# Patient Record
Sex: MALE | Race: WHITE | Employment: UNEMPLOYED | URBAN - METROPOLITAN AREA
[De-identification: names, ages, dates, MRNs, and addresses within clinical notes are randomized per-mention and may not be internally consistent; named-entity substitution may affect disease eponyms.]

---

## 2017-01-09 ENCOUNTER — OFFICE VISIT (OUTPATIENT)
Dept: INTERNAL MEDICINE | Age: 62
End: 2017-01-09

## 2017-01-09 VITALS
BODY MASS INDEX: 26.52 KG/M2 | SYSTOLIC BLOOD PRESSURE: 140 MMHG | WEIGHT: 175 LBS | DIASTOLIC BLOOD PRESSURE: 78 MMHG | HEIGHT: 68 IN

## 2017-01-09 DIAGNOSIS — Z00.00 WELL ADULT EXAM: Primary | ICD-10-CM

## 2017-01-09 DIAGNOSIS — F34.1 DYSTHYMIA: ICD-10-CM

## 2017-01-09 DIAGNOSIS — E78.5 HYPERLIPIDEMIA, UNSPECIFIED HYPERLIPIDEMIA TYPE: Chronic | ICD-10-CM

## 2017-01-09 DIAGNOSIS — R03.0 BORDERLINE SYSTOLIC HTN: ICD-10-CM

## 2017-01-09 PROCEDURE — 99396 PREV VISIT EST AGE 40-64: CPT | Performed by: INTERNAL MEDICINE

## 2017-01-09 PROCEDURE — 93000 ELECTROCARDIOGRAM COMPLETE: CPT | Performed by: INTERNAL MEDICINE

## 2017-02-14 ENCOUNTER — TELEPHONE (OUTPATIENT)
Dept: INTERNAL MEDICINE | Age: 62
End: 2017-02-14

## 2017-02-15 ENCOUNTER — OFFICE VISIT (OUTPATIENT)
Dept: INTERNAL MEDICINE | Age: 62
End: 2017-02-15

## 2017-02-15 VITALS
BODY MASS INDEX: 26.83 KG/M2 | HEIGHT: 68 IN | SYSTOLIC BLOOD PRESSURE: 112 MMHG | WEIGHT: 177 LBS | DIASTOLIC BLOOD PRESSURE: 80 MMHG

## 2017-02-15 DIAGNOSIS — T14.8XXA PULLED MUSCLE: Primary | ICD-10-CM

## 2017-02-15 PROCEDURE — 99213 OFFICE O/P EST LOW 20 MIN: CPT | Performed by: INTERNAL MEDICINE

## 2017-02-15 ASSESSMENT — ENCOUNTER SYMPTOMS
CHEST TIGHTNESS: 0
EYE ITCHING: 0
ANAL BLEEDING: 0
ABDOMINAL DISTENTION: 0
APNEA: 0
RHINORRHEA: 0
FACIAL SWELLING: 0
ABDOMINAL PAIN: 0
COUGH: 0
CONSTIPATION: 0
EYE DISCHARGE: 0
SHORTNESS OF BREATH: 0
BLOOD IN STOOL: 0
EYE REDNESS: 0
EYE PAIN: 0
CHOKING: 0

## 2017-02-28 ENCOUNTER — TELEPHONE (OUTPATIENT)
Dept: INTERNAL MEDICINE | Age: 62
End: 2017-02-28

## 2017-02-28 DIAGNOSIS — Z00.00 WELL ADULT ON ROUTINE HEALTH CHECK: ICD-10-CM

## 2017-02-28 DIAGNOSIS — Z00.00 WELL ADULT ON ROUTINE HEALTH CHECK: Primary | ICD-10-CM

## 2017-02-28 LAB
A/G RATIO: 2.2 (ref 1.1–2.2)
ALBUMIN SERPL-MCNC: 4.8 G/DL (ref 3.4–5)
ALP BLD-CCNC: 77 U/L (ref 40–129)
ALT SERPL-CCNC: 24 U/L (ref 10–40)
ANION GAP SERPL CALCULATED.3IONS-SCNC: 20 MMOL/L (ref 3–16)
AST SERPL-CCNC: 17 U/L (ref 15–37)
BILIRUB SERPL-MCNC: 0.4 MG/DL (ref 0–1)
BUN BLDV-MCNC: 17 MG/DL (ref 7–20)
CALCIUM SERPL-MCNC: 9.7 MG/DL (ref 8.3–10.6)
CHLORIDE BLD-SCNC: 102 MMOL/L (ref 99–110)
CHOLESTEROL, TOTAL: 170 MG/DL (ref 0–199)
CO2: 23 MMOL/L (ref 21–32)
CREAT SERPL-MCNC: 0.9 MG/DL (ref 0.9–1.3)
GFR AFRICAN AMERICAN: >60
GFR NON-AFRICAN AMERICAN: >60
GLOBULIN: 2.2 G/DL
GLUCOSE BLD-MCNC: 100 MG/DL (ref 70–99)
HDLC SERPL-MCNC: 34 MG/DL (ref 40–60)
LDL CHOLESTEROL CALCULATED: 88 MG/DL
POTASSIUM SERPL-SCNC: 4.6 MMOL/L (ref 3.5–5.1)
SODIUM BLD-SCNC: 145 MMOL/L (ref 136–145)
TOTAL PROTEIN: 7 G/DL (ref 6.4–8.2)
TRIGL SERPL-MCNC: 242 MG/DL (ref 0–150)
TSH SERPL DL<=0.05 MIU/L-ACNC: 2.29 UIU/ML (ref 0.27–4.2)
VITAMIN D 25-HYDROXY: 34.6 NG/ML
VLDLC SERPL CALC-MCNC: 48 MG/DL

## 2017-03-01 ENCOUNTER — TELEPHONE (OUTPATIENT)
Dept: INTERNAL MEDICINE | Age: 62
End: 2017-03-01

## 2017-03-01 DIAGNOSIS — Z00.00 ANNUAL PHYSICAL EXAM: Primary | ICD-10-CM

## 2017-05-18 ENCOUNTER — TELEPHONE (OUTPATIENT)
Dept: INTERNAL MEDICINE | Age: 62
End: 2017-05-18

## 2017-05-18 DIAGNOSIS — C61 PROSTATE CANCER (HCC): Primary | ICD-10-CM

## 2017-05-27 DIAGNOSIS — C61 PROSTATE CANCER (HCC): ICD-10-CM

## 2017-05-27 LAB — PROSTATE SPECIFIC ANTIGEN: 2.23 NG/ML (ref 0–4)

## 2017-06-26 RX ORDER — SIMVASTATIN 20 MG
TABLET ORAL
Qty: 30 TABLET | Refills: 4 | Status: SHIPPED | OUTPATIENT
Start: 2017-06-26 | End: 2018-08-17 | Stop reason: SDUPTHER

## 2017-09-05 ENCOUNTER — TELEPHONE (OUTPATIENT)
Dept: INTERNAL MEDICINE | Age: 62
End: 2017-09-05

## 2017-09-05 DIAGNOSIS — C61 PROSTATE CANCER (HCC): Primary | ICD-10-CM

## 2017-09-05 DIAGNOSIS — E55.9 VITAMIN D DEFICIENCY: ICD-10-CM

## 2017-09-05 DIAGNOSIS — E78.5 HYPERLIPIDEMIA, UNSPECIFIED HYPERLIPIDEMIA TYPE: Chronic | ICD-10-CM

## 2017-09-19 DIAGNOSIS — E55.9 VITAMIN D DEFICIENCY: ICD-10-CM

## 2017-09-19 DIAGNOSIS — E78.5 HYPERLIPIDEMIA, UNSPECIFIED HYPERLIPIDEMIA TYPE: Chronic | ICD-10-CM

## 2017-09-19 DIAGNOSIS — C61 PROSTATE CANCER (HCC): ICD-10-CM

## 2017-09-19 LAB
A/G RATIO: 1.9 (ref 1.1–2.2)
ALBUMIN SERPL-MCNC: 4.7 G/DL (ref 3.4–5)
ALP BLD-CCNC: 73 U/L (ref 40–129)
ALT SERPL-CCNC: 21 U/L (ref 10–40)
ANION GAP SERPL CALCULATED.3IONS-SCNC: 19 MMOL/L (ref 3–16)
AST SERPL-CCNC: 16 U/L (ref 15–37)
BASOPHILS ABSOLUTE: 0.1 K/UL (ref 0–0.2)
BASOPHILS RELATIVE PERCENT: 1.2 %
BILIRUB SERPL-MCNC: 0.6 MG/DL (ref 0–1)
BUN BLDV-MCNC: 16 MG/DL (ref 7–20)
CALCIUM SERPL-MCNC: 9.6 MG/DL (ref 8.3–10.6)
CHLORIDE BLD-SCNC: 101 MMOL/L (ref 99–110)
CHOLESTEROL, TOTAL: 165 MG/DL (ref 0–199)
CO2: 23 MMOL/L (ref 21–32)
CREAT SERPL-MCNC: 0.9 MG/DL (ref 0.9–1.3)
EOSINOPHILS ABSOLUTE: 0.3 K/UL (ref 0–0.6)
EOSINOPHILS RELATIVE PERCENT: 2.6 %
GFR AFRICAN AMERICAN: >60
GFR NON-AFRICAN AMERICAN: >60
GLOBULIN: 2.5 G/DL
GLUCOSE BLD-MCNC: 93 MG/DL (ref 70–99)
HCT VFR BLD CALC: 47.2 % (ref 40.5–52.5)
HDLC SERPL-MCNC: 32 MG/DL (ref 40–60)
HEMOGLOBIN: 15.8 G/DL (ref 13.5–17.5)
LDL CHOLESTEROL CALCULATED: 78 MG/DL
LYMPHOCYTES ABSOLUTE: 2.3 K/UL (ref 1–5.1)
LYMPHOCYTES RELATIVE PERCENT: 22.1 %
MCH RBC QN AUTO: 27.7 PG (ref 26–34)
MCHC RBC AUTO-ENTMCNC: 33.5 G/DL (ref 31–36)
MCV RBC AUTO: 82.7 FL (ref 80–100)
MONOCYTES ABSOLUTE: 0.7 K/UL (ref 0–1.3)
MONOCYTES RELATIVE PERCENT: 6.9 %
NEUTROPHILS ABSOLUTE: 7 K/UL (ref 1.7–7.7)
NEUTROPHILS RELATIVE PERCENT: 67.2 %
PDW BLD-RTO: 14.4 % (ref 12.4–15.4)
PLATELET # BLD: 241 K/UL (ref 135–450)
PMV BLD AUTO: 9.5 FL (ref 5–10.5)
POTASSIUM SERPL-SCNC: 4.3 MMOL/L (ref 3.5–5.1)
PROSTATE SPECIFIC ANTIGEN: 2.27 NG/ML (ref 0–4)
RBC # BLD: 5.7 M/UL (ref 4.2–5.9)
SODIUM BLD-SCNC: 143 MMOL/L (ref 136–145)
TOTAL PROTEIN: 7.2 G/DL (ref 6.4–8.2)
TRIGL SERPL-MCNC: 273 MG/DL (ref 0–150)
VITAMIN D 25-HYDROXY: 31.7 NG/ML
VLDLC SERPL CALC-MCNC: 55 MG/DL
WBC # BLD: 10.3 K/UL (ref 4–11)

## 2017-09-26 ENCOUNTER — TELEPHONE (OUTPATIENT)
Dept: INTERNAL MEDICINE | Age: 62
End: 2017-09-26

## 2017-12-18 ENCOUNTER — TELEPHONE (OUTPATIENT)
Dept: INTERNAL MEDICINE | Age: 62
End: 2017-12-18

## 2017-12-18 DIAGNOSIS — C61 PROSTATE CANCER (HCC): Primary | ICD-10-CM

## 2017-12-26 LAB — PROSTATE SPECIFIC ANTIGEN: 2.08 NG/ML (ref 0–4)

## 2018-01-02 ENCOUNTER — TELEPHONE (OUTPATIENT)
Dept: INTERNAL MEDICINE | Age: 63
End: 2018-01-02

## 2018-02-08 ENCOUNTER — TELEPHONE (OUTPATIENT)
Dept: INTERNAL MEDICINE | Age: 63
End: 2018-02-08

## 2018-03-07 ENCOUNTER — TELEPHONE (OUTPATIENT)
Dept: INTERNAL MEDICINE | Age: 63
End: 2018-03-07

## 2018-03-07 DIAGNOSIS — C61 PROSTATE CANCER (HCC): ICD-10-CM

## 2018-03-07 DIAGNOSIS — Z00.00 WELL ADULT HEALTH CHECK: Primary | ICD-10-CM

## 2018-03-07 NOTE — TELEPHONE ENCOUNTER
Pt had to Reschedule physical appt to June 01 2018, but pt would like to have labs done now. Pt states he wants PSA, CBC, CMP, Vitamin D, Lipid and A1C. Please call pt when these are complete.

## 2018-03-16 DIAGNOSIS — Z00.00 WELL ADULT HEALTH CHECK: ICD-10-CM

## 2018-03-16 LAB
A/G RATIO: 2.6 (ref 1.1–2.2)
ALBUMIN SERPL-MCNC: 5.1 G/DL (ref 3.4–5)
ALP BLD-CCNC: 71 U/L (ref 40–129)
ALT SERPL-CCNC: 24 U/L (ref 10–40)
ANION GAP SERPL CALCULATED.3IONS-SCNC: 19 MMOL/L (ref 3–16)
AST SERPL-CCNC: 16 U/L (ref 15–37)
BASOPHILS ABSOLUTE: 0.1 K/UL (ref 0–0.2)
BASOPHILS RELATIVE PERCENT: 1 %
BILIRUB SERPL-MCNC: 0.3 MG/DL (ref 0–1)
BUN BLDV-MCNC: 18 MG/DL (ref 7–20)
CALCIUM SERPL-MCNC: 9.6 MG/DL (ref 8.3–10.6)
CHLORIDE BLD-SCNC: 101 MMOL/L (ref 99–110)
CHOLESTEROL, TOTAL: 156 MG/DL (ref 0–199)
CO2: 26 MMOL/L (ref 21–32)
CREAT SERPL-MCNC: 0.9 MG/DL (ref 0.8–1.3)
EOSINOPHILS ABSOLUTE: 0.3 K/UL (ref 0–0.6)
EOSINOPHILS RELATIVE PERCENT: 3.4 %
GFR AFRICAN AMERICAN: >60
GFR NON-AFRICAN AMERICAN: >60
GLOBULIN: 2 G/DL
GLUCOSE BLD-MCNC: 95 MG/DL (ref 70–99)
HCT VFR BLD CALC: 46.9 % (ref 40.5–52.5)
HDLC SERPL-MCNC: 33 MG/DL (ref 40–60)
HEMOGLOBIN: 16 G/DL (ref 13.5–17.5)
LDL CHOLESTEROL CALCULATED: 77 MG/DL
LYMPHOCYTES ABSOLUTE: 2.4 K/UL (ref 1–5.1)
LYMPHOCYTES RELATIVE PERCENT: 26.6 %
MCH RBC QN AUTO: 28.4 PG (ref 26–34)
MCHC RBC AUTO-ENTMCNC: 34.1 G/DL (ref 31–36)
MCV RBC AUTO: 83.2 FL (ref 80–100)
MONOCYTES ABSOLUTE: 0.6 K/UL (ref 0–1.3)
MONOCYTES RELATIVE PERCENT: 7.3 %
NEUTROPHILS ABSOLUTE: 5.5 K/UL (ref 1.7–7.7)
NEUTROPHILS RELATIVE PERCENT: 61.7 %
PDW BLD-RTO: 14.2 % (ref 12.4–15.4)
PLATELET # BLD: 233 K/UL (ref 135–450)
PMV BLD AUTO: 9.4 FL (ref 5–10.5)
POTASSIUM SERPL-SCNC: 4.4 MMOL/L (ref 3.5–5.1)
PROSTATE SPECIFIC ANTIGEN: 2.22 NG/ML (ref 0–4)
RBC # BLD: 5.63 M/UL (ref 4.2–5.9)
SODIUM BLD-SCNC: 146 MMOL/L (ref 136–145)
TOTAL PROTEIN: 7.1 G/DL (ref 6.4–8.2)
TRIGL SERPL-MCNC: 229 MG/DL (ref 0–150)
TSH SERPL DL<=0.05 MIU/L-ACNC: 2.06 UIU/ML (ref 0.27–4.2)
VITAMIN D 25-HYDROXY: 53.4 NG/ML
VLDLC SERPL CALC-MCNC: 46 MG/DL
WBC # BLD: 8.9 K/UL (ref 4–11)

## 2018-03-17 LAB
ESTIMATED AVERAGE GLUCOSE: 116.9 MG/DL
HBA1C MFR BLD: 5.7 %

## 2018-03-19 ENCOUNTER — TELEPHONE (OUTPATIENT)
Dept: INTERNAL MEDICINE | Age: 63
End: 2018-03-19

## 2018-03-26 ENCOUNTER — TELEPHONE (OUTPATIENT)
Dept: INTERNAL MEDICINE | Age: 63
End: 2018-03-26

## 2018-03-26 ENCOUNTER — OFFICE VISIT (OUTPATIENT)
Dept: ENT CLINIC | Age: 63
End: 2018-03-26

## 2018-03-26 ENCOUNTER — OFFICE VISIT (OUTPATIENT)
Dept: INTERNAL MEDICINE | Age: 63
End: 2018-03-26

## 2018-03-26 VITALS
WEIGHT: 165 LBS | BODY MASS INDEX: 25.01 KG/M2 | DIASTOLIC BLOOD PRESSURE: 80 MMHG | SYSTOLIC BLOOD PRESSURE: 110 MMHG | HEIGHT: 68 IN

## 2018-03-26 VITALS
HEIGHT: 68 IN | TEMPERATURE: 98.3 F | HEART RATE: 50 BPM | SYSTOLIC BLOOD PRESSURE: 154 MMHG | DIASTOLIC BLOOD PRESSURE: 79 MMHG | BODY MASS INDEX: 26.28 KG/M2 | WEIGHT: 173.4 LBS

## 2018-03-26 DIAGNOSIS — H61.22 IMPACTED CERUMEN OF LEFT EAR: ICD-10-CM

## 2018-03-26 DIAGNOSIS — K13.0 ABSCESS, LIP: Primary | ICD-10-CM

## 2018-03-26 DIAGNOSIS — L02.02 FURUNCLE OF FACE: Primary | ICD-10-CM

## 2018-03-26 PROCEDURE — 99213 OFFICE O/P EST LOW 20 MIN: CPT | Performed by: HOSPITALIST

## 2018-03-26 PROCEDURE — 99243 OFF/OP CNSLTJ NEW/EST LOW 30: CPT | Performed by: OTOLARYNGOLOGY

## 2018-03-26 PROCEDURE — 69210 REMOVE IMPACTED EAR WAX UNI: CPT | Performed by: OTOLARYNGOLOGY

## 2018-03-26 RX ORDER — SULFAMETHOXAZOLE AND TRIMETHOPRIM 800; 160 MG/1; MG/1
1 TABLET ORAL 2 TIMES DAILY
Qty: 20 TABLET | Refills: 1 | Status: SHIPPED | OUTPATIENT
Start: 2018-03-26 | End: 2018-04-05

## 2018-03-26 NOTE — PROGRESS NOTES
Thought content normal.           Assessment/ plan: Kristina Chen was seen today for swelling.     Diagnoses and all orders for this visit:    Furuncle of face, left lower lip margin: explained to pt that he will need I & D and possibly a course of abx as well.  -placed a call to Dr Debby Griffin who will fit him in this morning  -pt to return to me if Dr Debby Griffin recommends abx    Follow-up as needed  Wendy Faith MD, Susan Leslie

## 2018-03-26 NOTE — PROGRESS NOTES
canal.  TUNING FORKS: Rinne ++ Dumas midline at 512 Hz  INTRANASAL:  Septum midline, turbinates normal, meati clear. LIPS, TEETH, GINGIVA:  Abscess left lower lip. PHARYNX:  Normal  NECK:  No masses or adenopathy  SALIVARY GLANDS:  Normal  THYROID:  Normal  Cerumen removed from the left ear using suction. The tympanic membrane and middle ear space are normal.  IMPRESSION: Abscess left lower lip. PLAN: Patient is disinclined to have incision and drainage under local anesthetic. After discussion with patient, agree to prescribe Bactrim DS twice a day for 10 days. FOLLOW-UP: 10 days unless swelling and pain increase.

## 2018-06-01 ENCOUNTER — OFFICE VISIT (OUTPATIENT)
Dept: INTERNAL MEDICINE | Age: 63
End: 2018-06-01

## 2018-06-01 VITALS
SYSTOLIC BLOOD PRESSURE: 138 MMHG | DIASTOLIC BLOOD PRESSURE: 80 MMHG | BODY MASS INDEX: 26.37 KG/M2 | WEIGHT: 174 LBS | HEIGHT: 68 IN

## 2018-06-01 DIAGNOSIS — E78.5 HYPERLIPIDEMIA, UNSPECIFIED HYPERLIPIDEMIA TYPE: Chronic | ICD-10-CM

## 2018-06-01 DIAGNOSIS — R94.31 EKG ABNORMALITIES: ICD-10-CM

## 2018-06-01 DIAGNOSIS — L30.9 ECZEMA, UNSPECIFIED TYPE: ICD-10-CM

## 2018-06-01 DIAGNOSIS — Z00.00 WELL ADULT EXAM: Primary | ICD-10-CM

## 2018-06-01 DIAGNOSIS — C61 PROSTATE CANCER (HCC): ICD-10-CM

## 2018-06-01 DIAGNOSIS — E55.9 VITAMIN D DEFICIENCY: ICD-10-CM

## 2018-06-01 DIAGNOSIS — F34.1 DYSTHYMIA: ICD-10-CM

## 2018-06-01 PROCEDURE — 93000 ELECTROCARDIOGRAM COMPLETE: CPT | Performed by: INTERNAL MEDICINE

## 2018-06-01 PROCEDURE — 99396 PREV VISIT EST AGE 40-64: CPT | Performed by: INTERNAL MEDICINE

## 2018-06-01 RX ORDER — TRIAMCINOLONE ACETONIDE 1 MG/G
CREAM TOPICAL
Qty: 1 TUBE | Refills: 1 | Status: SHIPPED | OUTPATIENT
Start: 2018-06-01 | End: 2019-09-17 | Stop reason: CLARIF

## 2018-06-01 RX ORDER — BUPROPION HYDROCHLORIDE 150 MG/1
150 TABLET ORAL EVERY MORNING
Qty: 30 TABLET | Refills: 5 | Status: SHIPPED | OUTPATIENT
Start: 2018-06-01 | End: 2019-09-17 | Stop reason: CLARIF

## 2018-06-01 ASSESSMENT — PATIENT HEALTH QUESTIONNAIRE - PHQ9
10. IF YOU CHECKED OFF ANY PROBLEMS, HOW DIFFICULT HAVE THESE PROBLEMS MADE IT FOR YOU TO DO YOUR WORK, TAKE CARE OF THINGS AT HOME, OR GET ALONG WITH OTHER PEOPLE: 0
9. THOUGHTS THAT YOU WOULD BE BETTER OFF DEAD, OR OF HURTING YOURSELF: 0
8. MOVING OR SPEAKING SO SLOWLY THAT OTHER PEOPLE COULD HAVE NOTICED. OR THE OPPOSITE, BEING SO FIGETY OR RESTLESS THAT YOU HAVE BEEN MOVING AROUND A LOT MORE THAN USUAL: 0
5. POOR APPETITE OR OVEREATING: 0
1. LITTLE INTEREST OR PLEASURE IN DOING THINGS: 0
2. FEELING DOWN, DEPRESSED OR HOPELESS: 3
4. FEELING TIRED OR HAVING LITTLE ENERGY: 0
6. FEELING BAD ABOUT YOURSELF - OR THAT YOU ARE A FAILURE OR HAVE LET YOURSELF OR YOUR FAMILY DOWN: 3
7. TROUBLE CONCENTRATING ON THINGS, SUCH AS READING THE NEWSPAPER OR WATCHING TELEVISION: 0
SUM OF ALL RESPONSES TO PHQ QUESTIONS 1-9: 6
SUM OF ALL RESPONSES TO PHQ9 QUESTIONS 1 & 2: 3
3. TROUBLE FALLING OR STAYING ASLEEP: 0

## 2018-06-13 DIAGNOSIS — C61 PROSTATE CANCER (HCC): ICD-10-CM

## 2018-06-13 LAB — PROSTATE SPECIFIC ANTIGEN: 5.17 NG/ML (ref 0–4)

## 2018-06-18 ENCOUNTER — TELEPHONE (OUTPATIENT)
Dept: INTERNAL MEDICINE | Age: 63
End: 2018-06-18

## 2018-06-18 DIAGNOSIS — R97.20 ELEVATED PSA MEASUREMENT: Primary | ICD-10-CM

## 2018-06-19 ENCOUNTER — TELEPHONE (OUTPATIENT)
Dept: INTERNAL MEDICINE | Age: 63
End: 2018-06-19

## 2018-06-19 DIAGNOSIS — R97.20 ELEVATED PSA MEASUREMENT: ICD-10-CM

## 2018-06-19 LAB — PROSTATE SPECIFIC ANTIGEN: 7.13 NG/ML (ref 0–4)

## 2018-06-20 ENCOUNTER — TELEPHONE (OUTPATIENT)
Dept: INTERNAL MEDICINE | Age: 63
End: 2018-06-20

## 2018-06-20 DIAGNOSIS — R97.20 ELEVATED PSA: ICD-10-CM

## 2018-06-20 DIAGNOSIS — R30.0 DYSURIA: Primary | ICD-10-CM

## 2018-06-20 LAB
BILIRUBIN URINE: NEGATIVE
BLOOD, URINE: NEGATIVE
CLARITY: CLEAR
COLOR: YELLOW
GLUCOSE URINE: NEGATIVE MG/DL
KETONES, URINE: NEGATIVE MG/DL
LEUKOCYTE ESTERASE, URINE: NEGATIVE
MICROSCOPIC EXAMINATION: NORMAL
NITRITE, URINE: NEGATIVE
PH UA: 5.5
PROTEIN UA: NEGATIVE MG/DL
SPECIFIC GRAVITY UA: 1.03
URINE TYPE: NORMAL
UROBILINOGEN, URINE: 0.2 E.U./DL

## 2018-06-20 RX ORDER — CIPROFLOXACIN 500 MG/1
500 TABLET, FILM COATED ORAL 2 TIMES DAILY
Qty: 28 TABLET | Refills: 0 | Status: SHIPPED | OUTPATIENT
Start: 2018-06-20 | End: 2018-07-04

## 2018-06-22 LAB — URINE CULTURE, ROUTINE: NORMAL

## 2018-06-25 DIAGNOSIS — C61 PROSTATE CANCER (HCC): Primary | ICD-10-CM

## 2018-08-07 ENCOUNTER — TELEPHONE (OUTPATIENT)
Dept: INTERNAL MEDICINE | Age: 63
End: 2018-08-07

## 2018-08-07 DIAGNOSIS — C61 PROSTATE CANCER (HCC): Primary | ICD-10-CM

## 2018-08-17 RX ORDER — SIMVASTATIN 20 MG
TABLET ORAL
Qty: 30 TABLET | Refills: 3 | Status: SHIPPED | OUTPATIENT
Start: 2018-08-17 | End: 2019-09-17 | Stop reason: CLARIF

## 2018-08-20 DIAGNOSIS — C61 PROSTATE CANCER (HCC): ICD-10-CM

## 2018-08-20 LAB — PROSTATE SPECIFIC ANTIGEN: 3.38 NG/ML (ref 0–4)

## 2018-11-26 ENCOUNTER — TELEPHONE (OUTPATIENT)
Dept: INTERNAL MEDICINE CLINIC | Age: 63
End: 2018-11-26

## 2018-11-26 DIAGNOSIS — C61 PROSTATE CANCER (HCC): Primary | ICD-10-CM

## 2018-12-14 DIAGNOSIS — C61 PROSTATE CANCER (HCC): ICD-10-CM

## 2018-12-14 LAB — PROSTATE SPECIFIC ANTIGEN: 3.04 NG/ML (ref 0–4)

## 2019-03-12 ENCOUNTER — TELEPHONE (OUTPATIENT)
Dept: INTERNAL MEDICINE CLINIC | Age: 64
End: 2019-03-12

## 2019-03-12 DIAGNOSIS — C61 PROSTATE CANCER (HCC): Primary | ICD-10-CM

## 2019-03-21 DIAGNOSIS — C61 PROSTATE CANCER (HCC): ICD-10-CM

## 2019-03-21 LAB — PROSTATE SPECIFIC ANTIGEN: 3.21 NG/ML (ref 0–4)

## 2019-08-01 ENCOUNTER — TELEPHONE (OUTPATIENT)
Dept: INTERNAL MEDICINE CLINIC | Age: 64
End: 2019-08-01

## 2019-08-01 DIAGNOSIS — Z00.00 WELL ADULT HEALTH CHECK: Primary | ICD-10-CM

## 2019-08-01 DIAGNOSIS — Z12.5 SCREENING PSA (PROSTATE SPECIFIC ANTIGEN): ICD-10-CM

## 2019-08-20 DIAGNOSIS — Z00.00 WELL ADULT HEALTH CHECK: ICD-10-CM

## 2019-08-20 DIAGNOSIS — Z12.5 SCREENING PSA (PROSTATE SPECIFIC ANTIGEN): ICD-10-CM

## 2019-08-20 LAB
A/G RATIO: 2.5 (ref 1.1–2.2)
ALBUMIN SERPL-MCNC: 4.9 G/DL (ref 3.4–5)
ALP BLD-CCNC: 80 U/L (ref 40–129)
ALT SERPL-CCNC: 17 U/L (ref 10–40)
ANION GAP SERPL CALCULATED.3IONS-SCNC: 15 MMOL/L (ref 3–16)
AST SERPL-CCNC: 15 U/L (ref 15–37)
BASOPHILS ABSOLUTE: 0 K/UL (ref 0–0.2)
BASOPHILS RELATIVE PERCENT: 0.5 %
BILIRUB SERPL-MCNC: <0.2 MG/DL (ref 0–1)
BILIRUBIN URINE: NEGATIVE
BLOOD, URINE: NEGATIVE
BUN BLDV-MCNC: 13 MG/DL (ref 7–20)
CALCIUM SERPL-MCNC: 9.8 MG/DL (ref 8.3–10.6)
CHLORIDE BLD-SCNC: 101 MMOL/L (ref 99–110)
CHOLESTEROL, TOTAL: 160 MG/DL (ref 0–199)
CLARITY: CLEAR
CO2: 24 MMOL/L (ref 21–32)
COLOR: YELLOW
CREAT SERPL-MCNC: 0.9 MG/DL (ref 0.8–1.3)
EOSINOPHILS ABSOLUTE: 0.3 K/UL (ref 0–0.6)
EOSINOPHILS RELATIVE PERCENT: 3.9 %
GFR AFRICAN AMERICAN: >60
GFR NON-AFRICAN AMERICAN: >60
GLOBULIN: 2 G/DL
GLUCOSE BLD-MCNC: 91 MG/DL (ref 70–99)
GLUCOSE URINE: NEGATIVE MG/DL
HCT VFR BLD CALC: 46.1 % (ref 40.5–52.5)
HDLC SERPL-MCNC: 31 MG/DL (ref 40–60)
HEMOGLOBIN: 15.7 G/DL (ref 13.5–17.5)
KETONES, URINE: NEGATIVE MG/DL
LDL CHOLESTEROL CALCULATED: 77 MG/DL
LEUKOCYTE ESTERASE, URINE: NEGATIVE
LYMPHOCYTES ABSOLUTE: 2.2 K/UL (ref 1–5.1)
LYMPHOCYTES RELATIVE PERCENT: 28.3 %
MCH RBC QN AUTO: 27.9 PG (ref 26–34)
MCHC RBC AUTO-ENTMCNC: 34 G/DL (ref 31–36)
MCV RBC AUTO: 82 FL (ref 80–100)
MICROSCOPIC EXAMINATION: NORMAL
MONOCYTES ABSOLUTE: 0.5 K/UL (ref 0–1.3)
MONOCYTES RELATIVE PERCENT: 6.1 %
NEUTROPHILS ABSOLUTE: 4.8 K/UL (ref 1.7–7.7)
NEUTROPHILS RELATIVE PERCENT: 61.2 %
NITRITE, URINE: NEGATIVE
PDW BLD-RTO: 14.5 % (ref 12.4–15.4)
PH UA: 5.5 (ref 5–8)
PLATELET # BLD: 219 K/UL (ref 135–450)
PMV BLD AUTO: 9.3 FL (ref 5–10.5)
POTASSIUM SERPL-SCNC: 4.5 MMOL/L (ref 3.5–5.1)
PROSTATE SPECIFIC ANTIGEN: 3.35 NG/ML (ref 0–4)
PROTEIN UA: NEGATIVE MG/DL
RBC # BLD: 5.62 M/UL (ref 4.2–5.9)
SODIUM BLD-SCNC: 140 MMOL/L (ref 136–145)
SPECIFIC GRAVITY UA: 1.02 (ref 1–1.03)
TOTAL PROTEIN: 6.9 G/DL (ref 6.4–8.2)
TRIGL SERPL-MCNC: 260 MG/DL (ref 0–150)
TSH SERPL DL<=0.05 MIU/L-ACNC: 1.52 UIU/ML (ref 0.27–4.2)
URINE TYPE: NORMAL
UROBILINOGEN, URINE: 0.2 E.U./DL
VLDLC SERPL CALC-MCNC: 52 MG/DL
WBC # BLD: 7.9 K/UL (ref 4–11)

## 2019-09-17 ENCOUNTER — OFFICE VISIT (OUTPATIENT)
Dept: INTERNAL MEDICINE CLINIC | Age: 64
End: 2019-09-17
Payer: COMMERCIAL

## 2019-09-17 VITALS
BODY MASS INDEX: 25.61 KG/M2 | DIASTOLIC BLOOD PRESSURE: 68 MMHG | SYSTOLIC BLOOD PRESSURE: 118 MMHG | WEIGHT: 169 LBS | HEIGHT: 68 IN

## 2019-09-17 DIAGNOSIS — C61 PROSTATE CANCER (HCC): ICD-10-CM

## 2019-09-17 DIAGNOSIS — E78.01 FAMILIAL HYPERCHOLESTEROLEMIA: Chronic | ICD-10-CM

## 2019-09-17 DIAGNOSIS — Z00.00 WELL ADULT EXAM: Primary | ICD-10-CM

## 2019-09-17 DIAGNOSIS — F34.1 DYSTHYMIA: ICD-10-CM

## 2019-09-17 PROCEDURE — 99396 PREV VISIT EST AGE 40-64: CPT | Performed by: INTERNAL MEDICINE

## 2019-09-17 RX ORDER — BUPROPION HYDROCHLORIDE 300 MG/1
300 TABLET ORAL EVERY MORNING
Qty: 30 TABLET | Refills: 3 | Status: SHIPPED | OUTPATIENT
Start: 2019-09-17 | End: 2019-09-18 | Stop reason: SDUPTHER

## 2019-09-17 RX ORDER — BUPROPION HYDROCHLORIDE 150 MG/1
150 TABLET ORAL EVERY MORNING
Qty: 7 TABLET | Refills: 0 | Status: SHIPPED | OUTPATIENT
Start: 2019-09-17 | End: 2019-09-18 | Stop reason: SDUPTHER

## 2019-09-17 NOTE — PROGRESS NOTES
YURIYIME    U/A:  Lab Results   Component Value Date    LABMICR Not Indicated 08/20/2019          Lab Results   Component Value Date    LABA1C 5.7 03/16/2018        Lab Results   Component Value Date    CREATININE 0.9 08/20/2019       -----------------------------------------------------------------     Assessment/Plan:   1. Well adult exam  Check screening labs he was offered a Shingrix but declined he is also suggest to get a flu shot continue diet and exercise    2. Familial hypercholesterolemia  Problem is stable check above labs continue present meds    3. Prostate cancer [facility])  Problem is stable followed by urology he is considering a second opinion at [facility] with Macel Fabry    4.  Dysthymia  Trial of Wellbutrin 150 x 1 week then 300 and will follow-up in 1 month

## 2019-09-18 RX ORDER — BUPROPION HYDROCHLORIDE 150 MG/1
150 TABLET ORAL EVERY MORNING
Qty: 7 TABLET | Refills: 0 | Status: SHIPPED
Start: 2019-09-18 | End: 2020-09-01 | Stop reason: CLARIF

## 2019-09-18 RX ORDER — BUPROPION HYDROCHLORIDE 300 MG/1
300 TABLET ORAL EVERY MORNING
Qty: 30 TABLET | Refills: 3 | Status: SHIPPED
Start: 2019-09-18 | End: 2020-09-01 | Stop reason: CLARIF

## 2019-11-25 ENCOUNTER — TELEPHONE (OUTPATIENT)
Dept: INTERNAL MEDICINE CLINIC | Age: 64
End: 2019-11-25

## 2019-11-25 DIAGNOSIS — Z12.5 SCREENING PSA (PROSTATE SPECIFIC ANTIGEN): Primary | ICD-10-CM

## 2020-01-16 DIAGNOSIS — Z12.5 SCREENING PSA (PROSTATE SPECIFIC ANTIGEN): ICD-10-CM

## 2020-01-16 LAB — PROSTATE SPECIFIC ANTIGEN: 11.9 NG/ML (ref 0–4)

## 2020-01-20 ENCOUNTER — TELEPHONE (OUTPATIENT)
Dept: INTERNAL MEDICINE CLINIC | Age: 65
End: 2020-01-20

## 2020-01-20 NOTE — TELEPHONE ENCOUNTER
RE:Order for mri of prostate     From  Haim Abarca To  [facility] Kael [address]  1/17/2020  3:16 PM   Hi Sravanthi,   I would like to get another MRI as part of my Active Surveillance protocol for prostate cancer, and at the same time look into a recent spike in PSA (not in that order).    I would like to do this before a subsequent appointment with my urologist.     Could you ask Dr. Saunders All if he would repeat the order from prior times and fax the order to me?  Or post it on [address] [place] [address] Floor in some way? ?   I will probably forward to a Dr. Gregg Agrawal at [address] in [place], [place] that decision is still pending. Thank you!    Sridhar Matute

## 2020-02-10 ENCOUNTER — TELEPHONE (OUTPATIENT)
Dept: INTERNAL MEDICINE CLINIC | Age: 65
End: 2020-02-10

## 2020-02-10 ENCOUNTER — OFFICE VISIT (OUTPATIENT)
Dept: INTERNAL MEDICINE CLINIC | Age: 65
End: 2020-02-10
Payer: COMMERCIAL

## 2020-02-10 VITALS
DIASTOLIC BLOOD PRESSURE: 70 MMHG | WEIGHT: 171 LBS | SYSTOLIC BLOOD PRESSURE: 130 MMHG | HEIGHT: 68 IN | TEMPERATURE: 99.5 F | BODY MASS INDEX: 25.91 KG/M2

## 2020-02-10 LAB
INFLUENZA A ANTIBODY: NORMAL
INFLUENZA B ANTIBODY: NORMAL

## 2020-02-10 PROCEDURE — 3017F COLORECTAL CA SCREEN DOC REV: CPT | Performed by: INTERNAL MEDICINE

## 2020-02-10 PROCEDURE — G8419 CALC BMI OUT NRM PARAM NOF/U: HCPCS | Performed by: INTERNAL MEDICINE

## 2020-02-10 PROCEDURE — G8484 FLU IMMUNIZE NO ADMIN: HCPCS | Performed by: INTERNAL MEDICINE

## 2020-02-10 PROCEDURE — 87804 INFLUENZA ASSAY W/OPTIC: CPT | Performed by: INTERNAL MEDICINE

## 2020-02-10 PROCEDURE — 99213 OFFICE O/P EST LOW 20 MIN: CPT | Performed by: INTERNAL MEDICINE

## 2020-02-10 PROCEDURE — G8427 DOCREV CUR MEDS BY ELIG CLIN: HCPCS | Performed by: INTERNAL MEDICINE

## 2020-02-10 PROCEDURE — 1036F TOBACCO NON-USER: CPT | Performed by: INTERNAL MEDICINE

## 2020-02-10 RX ORDER — OSELTAMIVIR PHOSPHATE 75 MG/1
75 CAPSULE ORAL 2 TIMES DAILY
Qty: 10 CAPSULE | Refills: 0 | Status: SHIPPED | OUTPATIENT
Start: 2020-02-10 | End: 2020-02-15

## 2020-02-10 RX ORDER — OSELTAMIVIR PHOSPHATE 75 MG/1
75 CAPSULE ORAL 2 TIMES DAILY
Qty: 10 CAPSULE | Refills: 0 | Status: SHIPPED | OUTPATIENT
Start: 2020-02-10 | End: 2020-02-10 | Stop reason: SDUPTHER

## 2020-02-10 SDOH — ECONOMIC STABILITY: TRANSPORTATION INSECURITY
IN THE PAST 12 MONTHS, HAS THE LACK OF TRANSPORTATION KEPT YOU FROM MEDICAL APPOINTMENTS OR FROM GETTING MEDICATIONS?: NO

## 2020-02-10 SDOH — ECONOMIC STABILITY: INCOME INSECURITY: HOW HARD IS IT FOR YOU TO PAY FOR THE VERY BASICS LIKE FOOD, HOUSING, MEDICAL CARE, AND HEATING?: NOT HARD AT ALL

## 2020-02-10 SDOH — ECONOMIC STABILITY: FOOD INSECURITY: WITHIN THE PAST 12 MONTHS, YOU WORRIED THAT YOUR FOOD WOULD RUN OUT BEFORE YOU GOT MONEY TO BUY MORE.: NEVER TRUE

## 2020-02-10 SDOH — ECONOMIC STABILITY: FOOD INSECURITY: WITHIN THE PAST 12 MONTHS, THE FOOD YOU BOUGHT JUST DIDN'T LAST AND YOU DIDN'T HAVE MONEY TO GET MORE.: NEVER TRUE

## 2020-02-10 SDOH — ECONOMIC STABILITY: TRANSPORTATION INSECURITY
IN THE PAST 12 MONTHS, HAS LACK OF TRANSPORTATION KEPT YOU FROM MEETINGS, WORK, OR FROM GETTING THINGS NEEDED FOR DAILY LIVING?: NO

## 2020-02-10 ASSESSMENT — ENCOUNTER SYMPTOMS
NAUSEA: 0
SORE THROAT: 0
VOMITING: 0
COUGH: 1
EYES NEGATIVE: 1
CONSTIPATION: 0
DIARRHEA: 0
SHORTNESS OF BREATH: 0
RHINORRHEA: 0
CHEST TIGHTNESS: 0
ABDOMINAL PAIN: 0

## 2020-08-12 ENCOUNTER — TELEPHONE (OUTPATIENT)
Dept: INTERNAL MEDICINE CLINIC | Age: 65
End: 2020-08-12

## 2020-08-28 DIAGNOSIS — C61 PROSTATE CANCER (HCC): ICD-10-CM

## 2020-08-28 DIAGNOSIS — Z00.00 WELL ADULT EXAM: ICD-10-CM

## 2020-08-28 LAB
A/G RATIO: 2.6 (ref 1.1–2.2)
ALBUMIN SERPL-MCNC: 4.9 G/DL (ref 3.4–5)
ALP BLD-CCNC: 83 U/L (ref 40–129)
ALT SERPL-CCNC: 21 U/L (ref 10–40)
ANION GAP SERPL CALCULATED.3IONS-SCNC: 11 MMOL/L (ref 3–16)
AST SERPL-CCNC: 20 U/L (ref 15–37)
BASOPHILS ABSOLUTE: 0.1 K/UL (ref 0–0.2)
BASOPHILS RELATIVE PERCENT: 0.8 %
BILIRUB SERPL-MCNC: 0.6 MG/DL (ref 0–1)
BILIRUBIN URINE: NEGATIVE
BLOOD, URINE: NEGATIVE
BUN BLDV-MCNC: 13 MG/DL (ref 7–20)
CALCIUM SERPL-MCNC: 9.5 MG/DL (ref 8.3–10.6)
CHLORIDE BLD-SCNC: 101 MMOL/L (ref 99–110)
CHOLESTEROL, TOTAL: 220 MG/DL (ref 0–199)
CLARITY: CLEAR
CO2: 26 MMOL/L (ref 21–32)
COLOR: YELLOW
CREAT SERPL-MCNC: 1 MG/DL (ref 0.8–1.3)
EOSINOPHILS ABSOLUTE: 0.4 K/UL (ref 0–0.6)
EOSINOPHILS RELATIVE PERCENT: 3.6 %
GFR AFRICAN AMERICAN: >60
GFR NON-AFRICAN AMERICAN: >60
GLOBULIN: 1.9 G/DL
GLUCOSE BLD-MCNC: 99 MG/DL (ref 70–99)
GLUCOSE URINE: NEGATIVE MG/DL
HCT VFR BLD CALC: 51 % (ref 40.5–52.5)
HDLC SERPL-MCNC: 35 MG/DL (ref 40–60)
HEMOGLOBIN: 16.9 G/DL (ref 13.5–17.5)
KETONES, URINE: NEGATIVE MG/DL
LDL CHOLESTEROL CALCULATED: ABNORMAL MG/DL
LDL CHOLESTEROL DIRECT: 145 MG/DL
LEUKOCYTE ESTERASE, URINE: NEGATIVE
LYMPHOCYTES ABSOLUTE: 3 K/UL (ref 1–5.1)
LYMPHOCYTES RELATIVE PERCENT: 29.2 %
MCH RBC QN AUTO: 27.4 PG (ref 26–34)
MCHC RBC AUTO-ENTMCNC: 33 G/DL (ref 31–36)
MCV RBC AUTO: 82.9 FL (ref 80–100)
MICROSCOPIC EXAMINATION: NORMAL
MONOCYTES ABSOLUTE: 0.8 K/UL (ref 0–1.3)
MONOCYTES RELATIVE PERCENT: 8.1 %
NEUTROPHILS ABSOLUTE: 6 K/UL (ref 1.7–7.7)
NEUTROPHILS RELATIVE PERCENT: 58.3 %
NITRITE, URINE: NEGATIVE
PDW BLD-RTO: 14.5 % (ref 12.4–15.4)
PH UA: 6.5 (ref 5–8)
PLATELET # BLD: 253 K/UL (ref 135–450)
PMV BLD AUTO: 9.8 FL (ref 5–10.5)
POTASSIUM SERPL-SCNC: 4.3 MMOL/L (ref 3.5–5.1)
PROSTATE SPECIFIC ANTIGEN: 6.42 NG/ML (ref 0–4)
PROTEIN UA: NEGATIVE MG/DL
RBC # BLD: 6.16 M/UL (ref 4.2–5.9)
SODIUM BLD-SCNC: 138 MMOL/L (ref 136–145)
SPECIFIC GRAVITY UA: 1.02 (ref 1–1.03)
TOTAL PROTEIN: 6.8 G/DL (ref 6.4–8.2)
TRIGL SERPL-MCNC: 316 MG/DL (ref 0–150)
TSH SERPL DL<=0.05 MIU/L-ACNC: 2.61 UIU/ML (ref 0.27–4.2)
URINE TYPE: NORMAL
UROBILINOGEN, URINE: 0.2 E.U./DL
VLDLC SERPL CALC-MCNC: ABNORMAL MG/DL
WBC # BLD: 10.3 K/UL (ref 4–11)

## 2020-09-01 ENCOUNTER — OFFICE VISIT (OUTPATIENT)
Dept: INTERNAL MEDICINE CLINIC | Age: 65
End: 2020-09-01
Payer: COMMERCIAL

## 2020-09-01 VITALS
HEIGHT: 68 IN | BODY MASS INDEX: 26.22 KG/M2 | SYSTOLIC BLOOD PRESSURE: 156 MMHG | DIASTOLIC BLOOD PRESSURE: 79 MMHG | WEIGHT: 173 LBS | TEMPERATURE: 98 F

## 2020-09-01 PROBLEM — F51.01 PRIMARY INSOMNIA: Status: ACTIVE | Noted: 2020-09-01

## 2020-09-01 PROCEDURE — 90715 TDAP VACCINE 7 YRS/> IM: CPT | Performed by: INTERNAL MEDICINE

## 2020-09-01 PROCEDURE — 90471 IMMUNIZATION ADMIN: CPT | Performed by: INTERNAL MEDICINE

## 2020-09-01 PROCEDURE — 90472 IMMUNIZATION ADMIN EACH ADD: CPT | Performed by: INTERNAL MEDICINE

## 2020-09-01 PROCEDURE — 90686 IIV4 VACC NO PRSV 0.5 ML IM: CPT | Performed by: INTERNAL MEDICINE

## 2020-09-01 PROCEDURE — 99396 PREV VISIT EST AGE 40-64: CPT | Performed by: INTERNAL MEDICINE

## 2020-09-01 RX ORDER — MIRTAZAPINE 7.5 MG/1
7.5 TABLET, FILM COATED ORAL NIGHTLY
Qty: 30 TABLET | Refills: 5 | Status: SHIPPED | OUTPATIENT
Start: 2020-09-01 | End: 2021-10-04 | Stop reason: CLARIF

## 2020-09-01 RX ORDER — CIPROFLOXACIN 500 MG/1
500 TABLET, FILM COATED ORAL 2 TIMES DAILY
Qty: 20 TABLET | Refills: 0 | Status: SHIPPED | OUTPATIENT
Start: 2020-09-01 | End: 2020-09-11

## 2020-09-01 NOTE — PROGRESS NOTES
Annual Wellness Visit     Patient:  Gilford Logan                                               : 1955  Age: 60 y.o. MRN: <[id number]>  Date : 2020      CHIEF COMPLAINT: Gilford Logan is a 60 y.o. male who presents for physical exam    1. EKG abnormalities  No cardiac symptoms including chest pain shortness of breath or any other problems    2. Familial hypercholesterolemia  No complaints no myalgias    3. Prostate cancer [facility])  This problem is stable his PSA has fluctuated he is being followed by urology    4. Dysthymia  Problem is stable off medicines    5. Primary insomnia  Complaint of insomnia is willing to try Remeron    6.  PE (physical exam), annual  Generally feels okay except as bloated above denies any chest pain shortness of breath or any other problems        Patient Active Problem List    Diagnosis Date Noted    Primary insomnia 2020    [facility]    Vitamin D deficiency 2013    EKG abnormalities 2012    Hyperlipidemia 10/05/2010    Depression 10/05/2010    GERD (gastroesophageal reflux disease) 10/05/2010    Meniere disease 10/05/2010       Constitutional:  Denies fever or chills   Eyes:  Denies change in visual acuity   HENT:  Denies nasal congestion or sore throat   Respiratory:  Denies cough or shortness of breath   Cardiovascular:  Denies chest pain or edema   GI:  Denies abdominal pain, nausea, vomiting, bloody stools or diarrhea   :  Denies dysuria   Musculoskeletal:  Denies back pain or joint pain   Integument:  Denies rash   Neurologic:  Denies headache, focal weakness or sensory changes   Endocrine:  Denies polyuria or polydipsia   Lymphatic:  Denies swollen glands   Psychiatric:  Denies depression or anxiety     Past Medical History:        Diagnosis Date    BPH (benign prostatic hyperplasia) 10/5/2010    Depression 10/5/2010    GERD (gastroesophageal reflux disease) 10/5/2010    Meniere disease 10/5/2010    Other and unspecified hyperlipidemia 10/5/2010    Primary insomnia 9/1/2020    Prostate cancer ([address] 10/5/2010    Unspecified vitamin D deficiency 3/25/2013       Past Surgical History:        Procedure Laterality Date    INNER EAR SURGERY      TONSILLECTOMY         Family History:  Family History   Problem Relation Age of Onset    Breast Cancer Mother     Cancer Father        Social History:  Social History     Socioeconomic History    Marital status:      Spouse name: None    Number of children: None    Years of education: None    Highest education level: None   Occupational History    None   Social Needs    Financial resource strain: Not hard at all   [employer] insecurity     Worry: Never true     Inability: Never true    Transportation needs     Medical: No     Non-medical: No   Tobacco Use    Smoking status: Never Smoker    Smokeless tobacco: Never Used   Substance and Sexual Activity    Alcohol use: No    Drug use: None    Sexual activity: None   Lifestyle    Physical activity     Days per week: None     Minutes per session: None    Stress: None   Relationships    Social connections     Talks on phone: None     Gets together: None     Attends [employer] service: None     Active member of club or organization: None     Attends meetings of clubs or organizations: None     Relationship status: None    Intimate partner violence     Fear of current or ex partner: None     Emotionally abused: None     Physically abused: None     Forced sexual activity: None   Other Topics Concern    None   Social History Narrative    None         Allergies:  Patient has no known allergies. Current Medications:    Prior to Admission medications    Medication Sig Start Date End Date Taking?  Authorizing Provider   mirtazapine (REMERON) 7.5 MG tablet Take 1 tablet by mouth nightly 9/1/20  Yes Marcello Ram MD   ciprofloxacin (CIPRO) 500 MG tablet Take 1 tablet by mouth 2 times daily for 10 days 9/1/20 9/11/20 Yes 08/28/2020    HDL 35 (L) 08/28/2020    LDLCALC see below 08/28/2020    TRIG 316 (H) 08/28/2020       INR: No results found for: INR, PROTIME    U/A:  Lab Results   Component Value Date    LABMICR Not Indicated 08/28/2020          Lab Results   Component Value Date    LABA1C 5.7 03/16/2018        Lab Results   Component Value Date    CREATININE 1.0 08/28/2020       -----------------------------------------------------------------     Assessment/Plan:   1. EKG abnormalities  This problem is stable we will continue to monitor    2. Familial hypercholesterolemia  Problem is extubated secondary to lack of exercise and weight gain will watch diet and exercise    3. Prostate cancer [facility])  He has stayed down from a high of 11 does have symptoms of prostatitis will try Cipro to see if this helps with his urinary frequency    4. Dysthymia  Problem is stable off meds    5. Primary insomnia  Trial of Remeron 7.5 nightly    6.  PE (physical exam), annual  Check screening labs continue diet and exercise strongly encouraged to try to lose weight flu shot and Tdap given he is to hold off on Shingrix

## 2020-09-01 NOTE — PROGRESS NOTES
Vaccine Information Sheet, \"Influenza - Inactivated\"  given to Arnel Grant, or parent/legal guardian of  Arnel Grant and verbalized understanding. Patient responses:    Have you ever had a reaction to a flu vaccine? No  Do you have any current illness? No  Have you ever had Guillian [place] Syndrome? No  Do you have a serious allergy to any of the follow: Neomycin, Polymyxin, Thimerosal, eggs or egg products? No    Flu vaccine given per order. Please see immunization tab. Risks and benefits explained. Current VIS given.

## 2021-03-09 ENCOUNTER — IMMUNIZATION (OUTPATIENT)
Dept: PRIMARY CARE CLINIC | Age: 66
End: 2021-03-09
Payer: COMMERCIAL

## 2021-03-09 PROCEDURE — 91301 COVID-19, MODERNA VACCINE 100MCG/0.5ML DOSE: CPT | Performed by: FAMILY MEDICINE

## 2021-03-09 PROCEDURE — 0011A COVID-19, MODERNA VACCINE 100MCG/0.5ML DOSE: CPT | Performed by: FAMILY MEDICINE

## 2021-04-06 ENCOUNTER — IMMUNIZATION (OUTPATIENT)
Dept: PRIMARY CARE CLINIC | Age: 66
End: 2021-04-06
Payer: COMMERCIAL

## 2021-04-06 PROCEDURE — 91301 COVID-19, MODERNA VACCINE 100MCG/0.5ML DOSE: CPT

## 2021-04-06 PROCEDURE — 0012A COVID-19, MODERNA VACCINE 100MCG/0.5ML DOSE: CPT

## 2021-06-30 ENCOUNTER — TELEPHONE (OUTPATIENT)
Dept: INTERNAL MEDICINE CLINIC | Age: 66
End: 2021-06-30

## 2021-06-30 DIAGNOSIS — R97.20 ABNORMAL PROSTATE SPECIFIC ANTIGEN TEST: Primary | ICD-10-CM

## 2021-06-30 NOTE — TELEPHONE ENCOUNTER
Yumiko Likes called in to see if  can prescribe a PSA Test that he is suppose to have quarterly.     Please call to advise

## 2021-07-01 DIAGNOSIS — R97.20 ABNORMAL PROSTATE SPECIFIC ANTIGEN TEST: ICD-10-CM

## 2021-07-01 LAB — PROSTATE SPECIFIC ANTIGEN: 4.75 NG/ML (ref 0–4)

## 2021-10-01 ENCOUNTER — TELEPHONE (OUTPATIENT)
Dept: INTERNAL MEDICINE CLINIC | Age: 66
End: 2021-10-01

## 2021-10-01 DIAGNOSIS — E55.9 VITAMIN D DEFICIENCY: ICD-10-CM

## 2021-10-01 DIAGNOSIS — Z00.00 WELL ADULT EXAM: Primary | ICD-10-CM

## 2021-10-01 DIAGNOSIS — R97.20 ABNORMAL PROSTATE SPECIFIC ANTIGEN TEST: ICD-10-CM

## 2021-10-01 NOTE — TELEPHONE ENCOUNTER
Non-Urgent Medical Question    Ashley Sanon MD 22 hours ago (3:41 PM)   [facility]  My visit with Dr. Eve Bradley is October 4. Prior to the time I was told I could have the annual tests done. Inclusive of PSA. These tests have not been ordered.

## 2021-10-04 ENCOUNTER — OFFICE VISIT (OUTPATIENT)
Dept: INTERNAL MEDICINE CLINIC | Age: 66
End: 2021-10-04
Payer: COMMERCIAL

## 2021-10-04 VITALS
DIASTOLIC BLOOD PRESSURE: 80 MMHG | SYSTOLIC BLOOD PRESSURE: 140 MMHG | HEIGHT: 68 IN | HEART RATE: 53 BPM | WEIGHT: 173 LBS | BODY MASS INDEX: 26.22 KG/M2

## 2021-10-04 DIAGNOSIS — Z23 NEED FOR INFLUENZA VACCINATION: ICD-10-CM

## 2021-10-04 DIAGNOSIS — C61 PROSTATE CANCER (HCC): ICD-10-CM

## 2021-10-04 DIAGNOSIS — Z11.59 ENCOUNTER FOR HEPATITIS C SCREENING TEST FOR LOW RISK PATIENT: ICD-10-CM

## 2021-10-04 DIAGNOSIS — Z11.4 ENCOUNTER FOR SCREENING FOR HUMAN IMMUNODEFICIENCY VIRUS (HIV): ICD-10-CM

## 2021-10-04 DIAGNOSIS — Z00.00 PE (PHYSICAL EXAM), ANNUAL: Primary | ICD-10-CM

## 2021-10-04 DIAGNOSIS — K21.9 GASTROESOPHAGEAL REFLUX DISEASE WITHOUT ESOPHAGITIS: ICD-10-CM

## 2021-10-04 DIAGNOSIS — E55.9 VITAMIN D DEFICIENCY: ICD-10-CM

## 2021-10-04 DIAGNOSIS — I10 PRIMARY HYPERTENSION: ICD-10-CM

## 2021-10-04 DIAGNOSIS — F34.1 DYSTHYMIA: ICD-10-CM

## 2021-10-04 DIAGNOSIS — E78.01 FAMILIAL HYPERCHOLESTEROLEMIA: Chronic | ICD-10-CM

## 2021-10-04 PROCEDURE — 99396 PREV VISIT EST AGE 40-64: CPT | Performed by: INTERNAL MEDICINE

## 2021-10-04 PROCEDURE — 90471 IMMUNIZATION ADMIN: CPT | Performed by: INTERNAL MEDICINE

## 2021-10-04 PROCEDURE — 90674 CCIIV4 VAC NO PRSV 0.5 ML IM: CPT | Performed by: INTERNAL MEDICINE

## 2021-10-04 PROCEDURE — 93000 ELECTROCARDIOGRAM COMPLETE: CPT | Performed by: INTERNAL MEDICINE

## 2021-10-04 RX ORDER — LOSARTAN POTASSIUM 50 MG/1
50 TABLET ORAL DAILY
Qty: 90 TABLET | Refills: 1 | Status: SHIPPED | OUTPATIENT
Start: 2021-10-04 | End: 2021-12-22

## 2021-10-04 RX ORDER — MIRTAZAPINE 7.5 MG/1
7.5 TABLET, FILM COATED ORAL NIGHTLY
Qty: 30 TABLET | Refills: 5 | Status: SHIPPED | OUTPATIENT
Start: 2021-10-04 | End: 2022-08-01

## 2021-10-04 SDOH — ECONOMIC STABILITY: FOOD INSECURITY: WITHIN THE PAST 12 MONTHS, THE FOOD YOU BOUGHT JUST DIDN'T LAST AND YOU DIDN'T HAVE MONEY TO GET MORE.: NEVER TRUE

## 2021-10-04 SDOH — ECONOMIC STABILITY: FOOD INSECURITY: WITHIN THE PAST 12 MONTHS, YOU WORRIED THAT YOUR FOOD WOULD RUN OUT BEFORE YOU GOT MONEY TO BUY MORE.: NEVER TRUE

## 2021-10-04 ASSESSMENT — SOCIAL DETERMINANTS OF HEALTH (SDOH): HOW HARD IS IT FOR YOU TO PAY FOR THE VERY BASICS LIKE FOOD, HOUSING, MEDICAL CARE, AND HEATING?: NOT HARD AT ALL

## 2021-10-04 NOTE — PROGRESS NOTES
Annual Wellness Visit     Patient:  Chinyere Julien                                               : 1955  Age: 63 y.o. MRN: <[id number]>  Date : 10/4/2021      CHIEF COMPLAINT: Chinyere Julien is a 63 y.o. male who presents for : Physical exam    1. Familial hypercholesterolemia  No complaints no myalgias  - mirtazapine (REMERON) 7.5 MG tablet; Take 1 tablet by mouth nightly  Dispense: 30 tablet; Refill: 5  - losartan (COZAAR) 50 MG tablet; Take 1 tablet by mouth daily  Dispense: 90 tablet; Refill: 1  - CBC Auto Differential; Future  - Comprehensive Metabolic Panel; Future  - Lipid Panel; Future  - TSH without Reflex; Future  - Urinalysis; Future  - Vitamin D 25 Hydroxy; Future    2. Gastroesophageal reflux disease without esophagitis  Problem is stable on present meds    3. Prostate cancer [facility])  This problem is followed by urology he is due for an MRI this year  - mirtazapine (REMERON) 7.5 MG tablet; Take 1 tablet by mouth nightly  Dispense: 30 tablet; Refill: 5  - losartan (COZAAR) 50 MG tablet; Take 1 tablet by mouth daily  Dispense: 90 tablet; Refill: 1  - CBC Auto Differential; Future  - Comprehensive Metabolic Panel; Future  - Lipid Panel; Future  - TSH without Reflex; Future  - Urinalysis; Future  - Vitamin D 25 Hydroxy; Future    4. Vitamin D deficiency    - Vitamin D 25 Hydroxy; Future    5. PE (physical exam), annual  Generally feels like he is under a lot of stress denies any chest pain or shortness of breath  - mirtazapine (REMERON) 7.5 MG tablet; Take 1 tablet by mouth nightly  Dispense: 30 tablet; Refill: 5  - losartan (COZAAR) 50 MG tablet; Take 1 tablet by mouth daily  Dispense: 90 tablet; Refill: 1  - CBC Auto Differential; Future  - Comprehensive Metabolic Panel; Future  - Lipid Panel; Future  - TSH without Reflex; Future  - Urinalysis; Future  - Vitamin D 25 Hydroxy; Future    6. Primary hypertension  Has had elevated blood pressure recently    7.  Dysthymia  Does feel stressed and has significant sleep disturbance  - External Referral To Psychology        Patient Active Problem List    Diagnosis Date Noted    Primary hypertension 10/04/2021    Primary insomnia 09/01/2020    Prostate cancer ([facility] 12/19/2014    Vitamin D deficiency 03/25/2013    EKG abnormalities 11/06/2012    Hyperlipidemia 10/05/2010    Depression 10/05/2010    GERD (gastroesophageal reflux disease) 10/05/2010    Meniere disease 10/05/2010       Constitutional:  Denies fever or chills   Eyes:  Denies change in visual acuity   HENT:  Denies nasal congestion or sore throat   Respiratory:  Denies cough or shortness of breath   Cardiovascular:  Denies chest pain or edema   GI:  Denies abdominal pain, nausea, vomiting, bloody stools or diarrhea   :  Denies dysuria   Musculoskeletal:  Denies back pain or joint pain   Integument:  Denies rash   Neurologic:  Denies headache, focal weakness or sensory changes   Endocrine:  Denies polyuria or polydipsia   Lymphatic:  Denies swollen glands   Psychiatric:  Denies depression or anxiety     Past Medical History:        Diagnosis Date    BPH (benign prostatic hyperplasia) 10/5/2010    Depression 10/5/2010    GERD (gastroesophageal reflux disease) 10/5/2010    Meniere disease 10/5/2010    Other and unspecified hyperlipidemia 10/5/2010    Primary hypertension 10/4/2021    Primary hypertension 10/4/2021    Primary insomnia 9/1/2020    Prostate cancer ([facility] 10/5/2010    Unspecified vitamin D deficiency 3/25/2013       Past Surgical History:        Procedure Laterality Date    INNER EAR SURGERY      TONSILLECTOMY         Family History:  Family History   Problem Relation Age of Onset    Breast Cancer Mother     Cancer Father        Social History:  Social History     Socioeconomic History    Marital status:      Spouse name: None    Number of children: None    Years of education: None    Highest education level: None   Occupational History    None   Tobacco Use  Smoking status: Never Smoker    Smokeless tobacco: Never Used   Substance and Sexual Activity    Alcohol use: No    Drug use: None    Sexual activity: None   Other Topics Concern    None   Social History Narrative    None     Social Determinants of Health     Financial Resource Strain: Low Risk     Difficulty of Paying Living Expenses: Not hard at all   Food Insecurity: No Food Insecurity    Worried About Running Out of Food in the Last Year: Never true    Michele of Food in the Last Year: Never true   Transportation Needs:     Lack of Transportation (Medical):  Lack of Transportation (Non-Medical):    Physical Activity:     Days of Exercise per Week:     Minutes of Exercise per Session:    Stress:     Feeling of Stress :    Social Connections:     Frequency of Communication with Friends and Family:     Frequency of Social Gatherings with Friends and Family:     Attends [employer] Services:     Active Member of Clubs or Organizations:     Attends Club or Organization Meetings:     Marital Status:    Intimate Partner Violence:     Fear of Current or Ex-Partner:     Emotionally Abused:     Physically Abused:     Sexually Abused: Allergies:  Patient has no known allergies. Current Medications:    Prior to Admission medications    Medication Sig Start Date End Date Taking? Authorizing Provider   mirtazapine (REMERON) 7.5 MG tablet Take 1 tablet by mouth nightly 10/4/21  Yes Iram Ann MD   losartan (COZAAR) 50 MG tablet Take 1 tablet by mouth daily 10/4/21  Yes Iram Ann MD           Physical Exam:      Constitutional:  Well developed, well nourished, no acute distress, non-toxic appearance   Eyes:  PERRL, conjunctiva normal   HENT:  Atraumatic, external ears normal, nose normal, oropharynx moist, no pharyngeal exudates.  Neck- normal range of motion, no tenderness, supple   Respiratory:  No respiratory distress, normal breath sounds, no rales, no wheezing   Cardiovascular: Normal rate, normal rhythm, no murmurs, no gallops, no rubs   GI:  Soft, nondistended, normal bowel sounds, nontender, no organomegaly, no mass, no rebound, no guarding   :  No costovertebral angle tenderness   Musculoskeletal:  No edema, no tenderness, no deformities. Back- no tenderness  Integument:  Well hydrated, no rash   Lymphatic:  No lymphadenopathy noted   Neurologic:  Alert & oriented x 3, CN 2-12 normal, normal motor function, normal sensory function, no focal deficits noted   Psychiatric:  Speech and behavior appropriate   Rectal exam reveals a normal-sized prostate heme-negative stool no nodularity    Vitals: BP (!) 140/80   Pulse 53   Ht 5' 8\" (1.727 m)   Wt 173 lb (78.5 kg)   BMI 26.30 kg/m²     Body mass index is 26.3 kg/m².   Wt Readings from Last 3 Encounters:   10/04/21 173 lb (78.5 kg)   09/01/20 173 lb (78.5 kg)   02/10/20 171 lb (77.6 kg)         LABS:    CBC:   Lab Results   Component Value Date    WBC 10.3 08/28/2020    HGB 16.9 08/28/2020    HCT 51.0 08/28/2020    MCV 82.9 08/28/2020     08/28/2020         No results found for: IRON, TIBC, FERRITIN, FOLATE, [id number], PTH                                                          BMP:    Lab Results   Component Value Date     08/28/2020    K 4.3 08/28/2020     08/28/2020    CO2 26 08/28/2020       LFT's:   Lab Results   Component Value Date    ALT 21 08/28/2020    AST 20 08/28/2020    ALKPHOS 83 08/28/2020    BILITOT 0.6 08/28/2020       Lipids:   Lab Results   Component Value Date    CHOL 220 (H) 08/28/2020    HDL 35 (L) 08/28/2020    LDLCALC see below 08/28/2020    TRIG 316 (H) 08/28/2020       INR: No results found for: INR, PROTIME    U/A:  Lab Results   Component Value Date    LABMICR Not Indicated 08/28/2020          Lab Results   Component Value Date    LABA1C 5.7 03/16/2018        Lab Results   Component Value Date    CREATININE 1.0 08/28/2020 -----------------------------------------------------------------     Assessment/Plan:   1. Familial hypercholesterolemia  Problem is stable check above labs continue watch diet  - mirtazapine (REMERON) 7.5 MG tablet; Take 1 tablet by mouth nightly  Dispense: 30 tablet; Refill: 5  - losartan (COZAAR) 50 MG tablet; Take 1 tablet by mouth daily  Dispense: 90 tablet; Refill: 1  - CBC Auto Differential; Future  - Comprehensive Metabolic Panel; Future  - Lipid Panel; Future  - TSH without Reflex; Future  - Urinalysis; Future  - Vitamin D 25 Hydroxy; Future    2. Gastroesophageal reflux disease without esophagitis  Problem stable continue to monitor    3. Prostate cancer [facility])  This problem is stable followed by urology to get a repeat MRI  - mirtazapine (REMERON) 7.5 MG tablet; Take 1 tablet by mouth nightly  Dispense: 30 tablet; Refill: 5  - losartan (COZAAR) 50 MG tablet; Take 1 tablet by mouth daily  Dispense: 90 tablet; Refill: 1  - CBC Auto Differential; Future  - Comprehensive Metabolic Panel; Future  - Lipid Panel; Future  - TSH without Reflex; Future  - Urinalysis; Future  - Vitamin D 25 Hydroxy; Future    4. Vitamin D deficiency  Check above lab  - Vitamin D 25 Hydroxy; Future    5. PE (physical exam), annual  Check screening labs continue diet and exercise flu shot given he is also get irrigation  - mirtazapine (REMERON) 7.5 MG tablet; Take 1 tablet by mouth nightly  Dispense: 30 tablet; Refill: 5  - losartan (COZAAR) 50 MG tablet; Take 1 tablet by mouth daily  Dispense: 90 tablet; Refill: 1  - CBC Auto Differential; Future  - Comprehensive Metabolic Panel; Future  - Lipid Panel; Future  - TSH without Reflex; Future  - Urinalysis; Future  - Vitamin D 25 Hydroxy; Future    6. Primary hypertension  Start losartan 50 nightly follow-up in 3 months    7.  Dysthymia  Trial of Remeron for sleep disturbance and dysthymia referral to psychology  - External Referral To Psychology

## 2021-12-20 ENCOUNTER — TELEPHONE (OUTPATIENT)
Dept: INTERNAL MEDICINE CLINIC | Age: 66
End: 2021-12-20

## 2021-12-20 NOTE — TELEPHONE ENCOUNTER
----- Message from Natalee Vu sent at 12/20/2021 11:17 AM EST -----  Subject: Message to Provider    QUESTIONS  Information for Provider? Patient would like to change appt to a VV on   12/22, patient states he has a good blood pressure machine at home , and   has been having good readings and would like to stay on the same Blood   pressure meds. ---------------------------------------------------------------------------  --------------  Elida OCONNOR  What is the best way for the office to contact you? OK to leave message on   voicemail  Preferred Call Back Phone Number? [phone]  ---------------------------------------------------------------------------  --------------  SCRIPT ANSWERS  Relationship to Patient?  Self

## 2021-12-22 ENCOUNTER — VIRTUAL VISIT (OUTPATIENT)
Dept: INTERNAL MEDICINE CLINIC | Age: 66
End: 2021-12-22
Payer: COMMERCIAL

## 2021-12-22 DIAGNOSIS — I10 PRIMARY HYPERTENSION: Primary | ICD-10-CM

## 2021-12-22 DIAGNOSIS — F51.01 PRIMARY INSOMNIA: ICD-10-CM

## 2021-12-22 PROCEDURE — 99213 OFFICE O/P EST LOW 20 MIN: CPT | Performed by: INTERNAL MEDICINE

## 2021-12-22 RX ORDER — MULTIVIT-MIN/IRON/FOLIC ACID/K 18-600-40
1 CAPSULE ORAL DAILY
Qty: 30 CAPSULE | Refills: 3 | COMMUNITY
Start: 2021-12-22

## 2021-12-22 RX ORDER — LOSARTAN POTASSIUM AND HYDROCHLOROTHIAZIDE 12.5; 5 MG/1; MG/1
1 TABLET ORAL DAILY
Qty: 90 TABLET | Refills: 3 | Status: SHIPPED | OUTPATIENT
Start: 2021-12-22

## 2021-12-22 NOTE — PROGRESS NOTES
2021    TELEHEALTH EVALUATION -- Audio/Visual (During QAGYD-[id number] public health emergency)    HPI: Follow-up hypertension insomnia    Teresa Hector (:  1955) has requested an audio/video evaluation for the following concern(s):    Follow-up of his hypertension his blood pressure has been averaging about 145/85 he feels fine with the losartan the Remeron seems to be doing okay for his sleep    Review of Systems no fevers chills shortness of breath or chest pain    Prior to Visit Medications    Medication Sig Taking?  Authorizing Provider   losartan-hydroCHLOROthiazide (HYZAAR) 50-12.5 MG per tablet Take 1 tablet by mouth daily Yes Holly Recinos MD   Vitamin D, Cholecalciferol, 50 MCG (2000) CAPS Take 1 capsule by mouth daily Yes Holly Recinos MD   mirtazapine (REMERON) 7.5 MG tablet Take 1 tablet by mouth nightly Yes Holly Recinos MD       Social History     Tobacco Use    Smoking status: Never Smoker    Smokeless tobacco: Never Used   Substance Use Topics    Alcohol use: No    Drug use: Not on file        Past Medical History:   Diagnosis Date    BPH (benign prostatic hyperplasia) 10/5/2010    Depression 10/5/2010    GERD (gastroesophageal reflux disease) 10/5/2010    Meniere disease 10/5/2010    Other and unspecified hyperlipidemia 10/5/2010    Primary hypertension 10/4/2021    Primary hypertension 10/4/2021    Primary insomnia 2020    [facility] 10/5/2010    Unspecified vitamin D deficiency 3/25/2013       PHYSICAL EXAMINATION:  [ INSTRUCTIONS:  \"[x]\" Indicates a positive item  \"[]\" Indicates a negative item  -- DELETE ALL ITEMS NOT EXAMINED]  Vital Signs: (As obtained by patient/caregiver or practitioner observation)    Blood pressure-  Heart rate-    Respiratory rate-    Temperature-  Pulse oximetry-     Constitutional: [x] Appears well-developed and well-nourished [x] No apparent distress      [] Abnormal-   Mental status  [x] Alert and awake  [x] Oriented to person/place/time []Able to follow commands      Eyes:  EOM    []  Normal  [] Abnormal-  Sclera  []  Normal  [] Abnormal -         Discharge []  None visible  [] Abnormal -    HENT:   [] Normocephalic, atraumatic. [] Abnormal   [] Mouth/Throat: Mucous membranes are moist.     External Ears [] Normal  [] Abnormal-     Neck: [] No visualized mass     Pulmonary/Chest: [x] Respiratory effort normal.  [x] No visualized signs of difficulty breathing or respiratory distress        [] Abnormal-      Musculoskeletal:   [] Normal gait with no signs of ataxia         [] Normal range of motion of neck        [] Abnormal-       Neurological:        [] No Facial Asymmetry (Cranial nerve 7 motor function) (limited exam to video visit)          [] No gaze palsy        [] Abnormal-         Skin:        [] No significant exanthematous lesions or discoloration noted on facial skin         [] Abnormal-            Psychiatric:       [x] Normal Affect [x] No Hallucinations        [] Abnormal-     Other pertinent observable physical exam findings-     ASSESSMENT/PLAN:  1. Familial hypercholesterolemia  This problem is stable continue present meds    2. Prostate cancer [facility])  This problem is stable followed by urology    3. Hypertension slightly elevated blood pressure well increase losartan to Hyzaar 5012-1/2 1 daily and he is to call in a couple weeks and tell what the blood pressure is  Insomnia controlled on Remeron continue Remeron for now    No follow-ups on file. Leah Davis, was evaluated through a synchronous (real-time) audio-video encounter. The patient (or guardian if applicable) is aware that this is a billable service. Verbal consent to proceed has been obtained within the past 12 months. The visit was conducted pursuant to the emergency declaration under the [facility], [address] [place] [address] authority and the [employer] and [employer] Act.   Patient

## 2022-01-03 NOTE — TELEPHONE ENCOUNTER
Karime Fagan MD 19 hours ago (2:44 PM)     AH      Just wanted to confirm that Im to take both prescriptions that say the primary use is to control high blood pressure. I dont want to duplicate and double something.  Thanks Latricia Roberts

## 2022-04-15 ENCOUNTER — OFFICE VISIT (OUTPATIENT)
Dept: INTERNAL MEDICINE CLINIC | Age: 67
End: 2022-04-15
Payer: COMMERCIAL

## 2022-04-15 VITALS — BODY MASS INDEX: 26.46 KG/M2 | HEART RATE: 61 BPM | TEMPERATURE: 97.5 F | WEIGHT: 174 LBS | OXYGEN SATURATION: 98 %

## 2022-04-15 DIAGNOSIS — R29.898 FATIGUE OF LOWER EXTREMITY: ICD-10-CM

## 2022-04-15 DIAGNOSIS — K92.1 BLOOD IN STOOL: ICD-10-CM

## 2022-04-15 DIAGNOSIS — R10.11 RIGHT UPPER QUADRANT ABDOMINAL PAIN: Primary | ICD-10-CM

## 2022-04-15 PROCEDURE — 99213 OFFICE O/P EST LOW 20 MIN: CPT | Performed by: INTERNAL MEDICINE

## 2022-04-15 RX ORDER — NYSTATIN 100000 U/G
CREAM TOPICAL
Qty: 30 G | Refills: 0 | Status: SHIPPED | OUTPATIENT
Start: 2022-04-15

## 2022-04-15 ASSESSMENT — PATIENT HEALTH QUESTIONNAIRE - PHQ9
SUM OF ALL RESPONSES TO PHQ QUESTIONS 1-9: 0
10. IF YOU CHECKED OFF ANY PROBLEMS, HOW DIFFICULT HAVE THESE PROBLEMS MADE IT FOR YOU TO DO YOUR WORK, TAKE CARE OF THINGS AT HOME, OR GET ALONG WITH OTHER PEOPLE: 0
7. TROUBLE CONCENTRATING ON THINGS, SUCH AS READING THE NEWSPAPER OR WATCHING TELEVISION: 0
SUM OF ALL RESPONSES TO PHQ QUESTIONS 1-9: 0
SUM OF ALL RESPONSES TO PHQ9 QUESTIONS 1 & 2: 0
SUM OF ALL RESPONSES TO PHQ9 QUESTIONS 1 & 2: 0
1. LITTLE INTEREST OR PLEASURE IN DOING THINGS: 0
1. LITTLE INTEREST OR PLEASURE IN DOING THINGS: 0
SUM OF ALL RESPONSES TO PHQ QUESTIONS 1-9: 0
SUM OF ALL RESPONSES TO PHQ QUESTIONS 1-9: 0
5. POOR APPETITE OR OVEREATING: 0
2. FEELING DOWN, DEPRESSED OR HOPELESS: 0
8. MOVING OR SPEAKING SO SLOWLY THAT OTHER PEOPLE COULD HAVE NOTICED. OR THE OPPOSITE, BEING SO FIGETY OR RESTLESS THAT YOU HAVE BEEN MOVING AROUND A LOT MORE THAN USUAL: 0
2. FEELING DOWN, DEPRESSED OR HOPELESS: 0
SUM OF ALL RESPONSES TO PHQ QUESTIONS 1-9: 0
SUM OF ALL RESPONSES TO PHQ QUESTIONS 1-9: 0
9. THOUGHTS THAT YOU WOULD BE BETTER OFF DEAD, OR OF HURTING YOURSELF: 0
4. FEELING TIRED OR HAVING LITTLE ENERGY: 0
SUM OF ALL RESPONSES TO PHQ QUESTIONS 1-9: 0
SUM OF ALL RESPONSES TO PHQ QUESTIONS 1-9: 0
3. TROUBLE FALLING OR STAYING ASLEEP: 0
6. FEELING BAD ABOUT YOURSELF - OR THAT YOU ARE A FAILURE OR HAVE LET YOURSELF OR YOUR FAMILY DOWN: 0

## 2022-04-15 NOTE — PROGRESS NOTES
Follow Up Visit  Established Patient Visit    Patient:  Corina Mcelroy                                               : 1955  Age: 61 y.o. MRN: [id number]  Date : 4/15/2022      CHIEF COMPLAINT: Corina Mcelroy is a 61 y.o. male Hx of HTN, depression, active surveillance prostate cancer who presents for :      1. Right upper quadrant abdominal pain    Pain under ribcage. Non-radiating. Episodic - worsened after physical activity. Onset 1 year ago. Described as a dull pain. Not related to food. Plays disc golf. Rated as 3-4/10 pain. Marijuana daily. Denies shortness of breath. He has not tried medication to relieve his pain. 2. Blood in stool  He notes blood in stool. Hx of hemorrhoids. Bright red after bowel movement only toilet paper has blood. It is not painful to have a bowel movement. Couple times per week he noticed. Hx of prostate cancer on active surveillance. Uses shower head to wash his bottom and that provides some relieve. No history of diverticulosis or constipation. 3. Lower extremity fatigue  Patient notes \"Tired legs\". Worsened with walking and standing. Described as cramping down both legs (R>L) especially on the front thigh. Relieved with sitting down. 2 months ago started. Can tolerate elliptical work. 4. Health maintenance  Wants 4th shot for COVID. Daughter getting  in month - wants to be protected for it. Wondering when the best time to get this would be.      Patient Active Problem List    Diagnosis Date Noted    Primary hypertension 10/04/2021    Primary insomnia 2020    Prostate cancer ([facility] [address] 2014    Vitamin D deficiency 2013    EKG abnormalities 2012    Hyperlipidemia 10/05/2010    Depression 10/05/2010    GERD (gastroesophageal reflux disease) 10/05/2010    Meniere disease 10/05/2010     Constitutional:  Denies fever or chills   Eyes:  Denies change in visual acuity   HENT:  Denies nasal congestion or sore throat   Respiratory: Denies cough or shortness of breath   Cardiovascular:  Denies chest pain or edema   GI: +Subcostal pain on right side, nausea, vomiting, bloody stools or diarrhea   :  Denies dysuria   Musculoskeletal:  +Leg cramping (R>L) located on front of leg. Integument:  Denies rash   Neurologic:  Denies headache, focal weakness or sensory changes   Endocrine:  Denies polyuria or polydipsia   Lymphatic:  Denies swollen glands   Psychiatric:  Denies depression or anxiety     Past Medical History:        Diagnosis Date    BPH (benign prostatic hyperplasia) 10/5/2010    Depression 10/5/2010    GERD (gastroesophageal reflux disease) 10/5/2010    Meniere disease 10/5/2010    Other and unspecified hyperlipidemia 10/5/2010    Primary hypertension 10/4/2021    Primary hypertension 10/4/2021    Primary insomnia 9/1/2020    Prostate cancer ([facility] [address] 10/5/2010    Unspecified vitamin D deficiency 3/25/2013       Past Surgical History:        Procedure Laterality Date    INNER EAR SURGERY      TONSILLECTOMY       Allergies:  Patient has no known allergies. Current Medications:    Prior to Admission medications    Medication Sig Start Date End Date Taking? Authorizing Provider   nystatin (MYCOSTATIN) 941126 UNIT/GM cream Apply topically 2 times daily. 4/15/22  Yes Laura Barrera MD   losartan-hydroCHLOROthiazide [facility]) 50-12.5 MG per tablet Take 1 tablet by mouth daily 12/22/21  Yes Laura Barrera MD   Vitamin D, Cholecalciferol, 50 MCG (2000 UT) CAPS Take 1 capsule by mouth daily 12/22/21  Yes Laura Barrera MD   mirtazapine (REMERON) 7.5 MG tablet Take 1 tablet by mouth nightly 10/4/21  Yes Lauar Barrera MD           Physical Exam:      Constitutional:  Well developed, well nourished, no acute distress, non-toxic appearance   Eyes:  PERRL, conjunctiva normal   HENT:  Atraumatic, external ears normal, nose normal, oropharynx moist, no pharyngeal exudates.  Neck- normal range of motion, no tenderness, supple   Respiratory:  No respiratory distress, normal breath sounds, no rales, no wheezing   Cardiovascular:  Normal rate, normal rhythm, no murmurs, no gallops, no rubs   GI:  There is mild right subcostal pain with twisting movement. Non tender to palpation. Soft, nondistended, normal bowel sounds, no organomegaly, no mass, no rebound, no guarding   Rectal Exam: FOBT negative. Small anal fissure noted. Small external hemorrhoid noted. :  No costovertebral angle tenderness   Musculoskeletal:  No edema, no tenderness, no deformities. Back- no tenderness. No pain with dorsiflexion of foot bilaterally. Peripheral pulses (radial and posterior tibialis) 2+ bilaterally. Integument:  Well hydrated, no rash   Lymphatic:  No lymphadenopathy noted   Neurologic:  Alert & oriented x 3, CN 2-12 normal, normal motor function, normal sensory function, no focal deficits noted   Psychiatric:  Speech and behavior appropriate       Vitals: Pulse 61   Temp 97.5 °F (36.4 °C)   Wt 174 lb (78.9 kg)   SpO2 98%   BMI 26.46 kg/m²     Body mass index is 26.46 kg/m².   Wt Readings from Last 3 Encounters:   04/15/22 174 lb (78.9 kg)   10/04/21 173 lb (78.5 kg)   09/01/20 173 lb (78.5 kg)         LABS:    CBC:   Lab Results   Component Value Date    WBC 9.5 10/08/2021    HGB 16.2 10/08/2021    HCT 48.5 10/08/2021    MCV 82.9 10/08/2021     10/08/2021         No results found for: IRON, TIBC, FERRITIN, FOLATE, [id number], PTH                                                          BMP:    Lab Results   Component Value Date     10/08/2021    K 4.5 10/08/2021     10/08/2021    CO2 26 10/08/2021       LFT's:   Lab Results   Component Value Date    ALT 20 10/08/2021    AST 19 10/08/2021    ALKPHOS 98 10/08/2021    BILITOT 0.6 10/08/2021       Lipids:   Lab Results   Component Value Date    CHOL 216 (H) 10/08/2021    HDL 33 (L) 10/08/2021    LDLCALC 136 (H) 10/08/2021    TRIG 237 (H) 10/08/2021       INR: No results found for: INR, PROTIME    U/A:  Lab Results   Component Value Date    LABMICR Not Indicated 08/28/2020          Lab Results   Component Value Date    LABA1C 5.7 03/16/2018        Lab Results   Component Value Date    CREATININE 0.9 10/08/2021       -----------------------------------------------------------------       Assessment/Plan:   1. Right upper quadrant abdominal pain  Muscular we will try Voltaren gel    2. Blood in stool  Is here with yeast proctitis and external hemorrhoid will give nystatin is cream and get fit test in 2 weeks    3.  Fatigue of lower extremity  Probably a result of spinal stenosis of his back we will give exercise program

## 2022-07-31 DIAGNOSIS — C61 PROSTATE CANCER (HCC): ICD-10-CM

## 2022-07-31 DIAGNOSIS — E78.01 FAMILIAL HYPERCHOLESTEROLEMIA: Chronic | ICD-10-CM

## 2022-07-31 DIAGNOSIS — Z00.00 PE (PHYSICAL EXAM), ANNUAL: ICD-10-CM

## 2022-08-01 RX ORDER — MIRTAZAPINE 7.5 MG/1
TABLET, FILM COATED ORAL
Qty: 30 TABLET | Refills: 5 | Status: SHIPPED | OUTPATIENT
Start: 2022-08-01

## 2022-09-19 ENCOUNTER — TELEPHONE (OUTPATIENT)
Dept: INTERNAL MEDICINE CLINIC | Age: 67
End: 2022-09-19

## 2022-09-19 NOTE — TELEPHONE ENCOUNTER
----- Message from Nancie Staley sent at 9/19/2022 12:46 PM EDT -----  Subject: Appointment Request    Reason for Call: Established Patient Appointment needed: Routine Physical   Exam    QUESTIONS    Reason for appointment request? No appointments available during search     Additional Information for Provider? Pt would like to schedule his   Physical in October.  Pt last Physical was on 10/05   ---------------------------------------------------------------------------  --------------  Gopal OCONNOR  [phone]; OK to leave message on voicemail  ---------------------------------------------------------------------------  --------------  SCRIPT ANSWERS  COVID Screen: Drew Evangelista

## 2022-11-14 ENCOUNTER — OFFICE VISIT (OUTPATIENT)
Dept: INTERNAL MEDICINE CLINIC | Age: 67
End: 2022-11-14
Payer: COMMERCIAL

## 2022-11-14 VITALS
TEMPERATURE: 98.2 F | SYSTOLIC BLOOD PRESSURE: 130 MMHG | HEIGHT: 68 IN | DIASTOLIC BLOOD PRESSURE: 80 MMHG | BODY MASS INDEX: 26.52 KG/M2 | WEIGHT: 175 LBS

## 2022-11-14 DIAGNOSIS — K21.9 GASTROESOPHAGEAL REFLUX DISEASE WITHOUT ESOPHAGITIS: ICD-10-CM

## 2022-11-14 DIAGNOSIS — E55.9 VITAMIN D DEFICIENCY: ICD-10-CM

## 2022-11-14 DIAGNOSIS — Z23 NEED FOR PNEUMOCOCCAL VACCINATION: ICD-10-CM

## 2022-11-14 DIAGNOSIS — Z23 NEED FOR INFLUENZA VACCINATION: ICD-10-CM

## 2022-11-14 DIAGNOSIS — I10 PRIMARY HYPERTENSION: ICD-10-CM

## 2022-11-14 DIAGNOSIS — Z00.00 PE (PHYSICAL EXAM), ANNUAL: Primary | ICD-10-CM

## 2022-11-14 DIAGNOSIS — E78.01 FAMILIAL HYPERCHOLESTEROLEMIA: Chronic | ICD-10-CM

## 2022-11-14 DIAGNOSIS — C61 PROSTATE CANCER (HCC): ICD-10-CM

## 2022-11-14 PROCEDURE — 90677 PCV20 VACCINE IM: CPT | Performed by: INTERNAL MEDICINE

## 2022-11-14 PROCEDURE — 90694 VACC AIIV4 NO PRSRV 0.5ML IM: CPT | Performed by: INTERNAL MEDICINE

## 2022-11-14 PROCEDURE — 3074F SYST BP LT 130 MM HG: CPT | Performed by: INTERNAL MEDICINE

## 2022-11-14 PROCEDURE — 90471 IMMUNIZATION ADMIN: CPT | Performed by: INTERNAL MEDICINE

## 2022-11-14 PROCEDURE — 90472 IMMUNIZATION ADMIN EACH ADD: CPT | Performed by: INTERNAL MEDICINE

## 2022-11-14 PROCEDURE — 3078F DIAST BP <80 MM HG: CPT | Performed by: INTERNAL MEDICINE

## 2022-11-14 PROCEDURE — 99396 PREV VISIT EST AGE 40-64: CPT | Performed by: INTERNAL MEDICINE

## 2022-11-14 RX ORDER — LOSARTAN POTASSIUM AND HYDROCHLOROTHIAZIDE 12.5; 5 MG/1; MG/1
1 TABLET ORAL DAILY
Qty: 90 TABLET | Refills: 3 | Status: SHIPPED | OUTPATIENT
Start: 2022-11-14

## 2022-11-14 SDOH — ECONOMIC STABILITY: FOOD INSECURITY: WITHIN THE PAST 12 MONTHS, THE FOOD YOU BOUGHT JUST DIDN'T LAST AND YOU DIDN'T HAVE MONEY TO GET MORE.: NEVER TRUE

## 2022-11-14 SDOH — ECONOMIC STABILITY: FOOD INSECURITY: WITHIN THE PAST 12 MONTHS, YOU WORRIED THAT YOUR FOOD WOULD RUN OUT BEFORE YOU GOT MONEY TO BUY MORE.: NEVER TRUE

## 2022-11-14 ASSESSMENT — SOCIAL DETERMINANTS OF HEALTH (SDOH): HOW HARD IS IT FOR YOU TO PAY FOR THE VERY BASICS LIKE FOOD, HOUSING, MEDICAL CARE, AND HEATING?: NOT HARD AT ALL

## 2022-11-14 NOTE — PROGRESS NOTES
Annual Wellness Visit     Patient:  Jeremy Lai                                               : 1955  Age: 61 y.o. MRN: [id number]  Date : 2022      CHIEF COMPLAINT: Jeremy Lai is a 61 y.o. male who presents for : Physical exam    1. Prostate cancer [facility])  This problem is stable and under active surveillance followed by urology last PSA has been stable around 5  - PSA, Prostatic Specific Antigen; Future    2. Primary hypertension  Blood pressures been well controlled though he has not been taking his blood pressure medicine regularly     3. Familial hypercholesterolemia  Has not been watching his diet as closely as before  - CBC with Auto Differential; Future  - Comprehensive Metabolic Panel; Future  - Lipid Panel; Future  - TSH; Future  - Urinalysis; Future  - Vitamin D 25 Hydroxy; Future    4. Gastroesophageal reflux disease without esophagitis  This problem is stable off medication    5. Vitamin D deficiency    - Vitamin D 25 Hydroxy; Future    6. PE (physical exam), annual  Generally feels good although he has not been exercising denies any chest pain shortness of breath or any other problems  - CBC with Auto Differential; Future  - Comprehensive Metabolic Panel; Future  - Lipid Panel; Future  - TSH; Future  - Urinalysis; Future  - Vitamin D 25 Hydroxy;  Future        Patient Active Problem List    Diagnosis Date Noted    Primary hypertension 10/04/2021    Primary insomnia 2020    [facility] 2014    Vitamin D deficiency 2013    EKG abnormalities 2012    Hyperlipidemia 10/05/2010    Depression 10/05/2010    GERD (gastroesophageal reflux disease) 10/05/2010    Meniere disease 10/05/2010       Constitutional:  Denies fever or chills   Eyes:  Denies change in visual acuity   HENT:  Denies nasal congestion or sore throat   Respiratory:  Denies cough or shortness of breath   Cardiovascular:  Denies chest pain or edema   GI:  Denies abdominal pain, nausea, vomiting, bloody stools or diarrhea   :  Denies dysuria   Musculoskeletal:  Denies back pain or joint pain   Integument:  Denies rash   Neurologic:  Denies headache, focal weakness or sensory changes   Endocrine:  Denies polyuria or polydipsia   Lymphatic:  Denies swollen glands   Psychiatric:  Denies depression or anxiety     Past Medical History:        Diagnosis Date    BPH (benign prostatic hyperplasia) 10/5/2010    Depression 10/5/2010    GERD (gastroesophageal reflux disease) 10/5/2010    Meniere disease 10/5/2010    Other and unspecified hyperlipidemia 10/5/2010    Primary hypertension 10/4/2021    Primary hypertension 10/4/2021    Primary insomnia 9/1/2020    Prostate cancer ([address] 10/5/2010    Unspecified vitamin D deficiency 3/25/2013       Past Surgical History:        Procedure Laterality Date    INNER EAR SURGERY      TONSILLECTOMY         Family History:  Family History   Problem Relation Age of Onset    Breast Cancer Mother     Cancer Father        Social History:  Social History     Socioeconomic History    Marital status:      Spouse name: None    Number of children: None    Years of education: None    Highest education level: None   Tobacco Use    Smoking status: Never    Smokeless tobacco: Never   Substance and Sexual Activity    Alcohol use: No     Social Determinants of Health     Financial Resource Strain: Low Risk     Difficulty of Paying Living Expenses: Not hard at all   Food Insecurity: No Food Insecurity    Worried About Running Out of Food in the Last Year: Never true    Ran Out of Food in the Last Year: Never true         Allergies:  Patient has no known allergies. Current Medications:    Prior to Admission medications    Medication Sig Start Date End Date Taking?  Authorizing Provider   losartan-hydroCHLOROthiazide [facility]) 50-12.5 MG per tablet Take 1 tablet by mouth daily 11/14/22  Yes Mima Max MD   Vitamin D, Cholecalciferol, 50 MCG (2000 UT) CAPS Take 1 capsule by mouth daily  Patient not taking: Reported on 11/14/2022 12/22/21   Janell León MD           Physical Exam:      Constitutional:  Well developed, well nourished, no acute distress, non-toxic appearance   Eyes:  PERRL, conjunctiva normal   HENT:  Atraumatic, external ears normal, nose normal, oropharynx moist, no pharyngeal exudates. Neck- normal range of motion, no tenderness, supple   Respiratory:  No respiratory distress, normal breath sounds, no rales, no wheezing   Cardiovascular:  Normal rate, normal rhythm, no murmurs, no gallops, no rubs   GI:  Soft, nondistended, normal bowel sounds, nontender, no organomegaly, no mass, no rebound, no guarding   :  No costovertebral angle tenderness   Musculoskeletal:  No edema, no tenderness, no deformities. Back- no tenderness  Integument:  Well hydrated, no rash   Lymphatic:  No lymphadenopathy noted   Neurologic:  Alert & oriented x 3, CN 2-12 normal, normal motor function, normal sensory function, no focal deficits noted   Psychiatric:  Speech and behavior appropriate       Vitals: /80   Temp 98.2 °F (36.8 °C)   Ht 5' 8\" (1.727 m)   Wt 175 lb (79.4 kg)   BMI 26.61 kg/m²     Body mass index is 26.61 kg/m².   Wt Readings from Last 3 Encounters:   11/14/22 175 lb (79.4 kg)   04/15/22 174 lb (78.9 kg)   10/04/21 173 lb (78.5 kg)         LABS:    CBC:   Lab Results   Component Value Date    WBC 9.5 10/08/2021    HGB 16.2 10/08/2021    HCT 48.5 10/08/2021    MCV 82.9 10/08/2021     10/08/2021         No results found for: IRON, TIBC, FERRITIN, FOLATE, [id number], PTH                                                          BMP:    Lab Results   Component Value Date     10/08/2021    K 4.5 10/08/2021     10/08/2021    CO2 26 10/08/2021       LFT's:   Lab Results   Component Value Date    ALT 20 10/08/2021    AST 19 10/08/2021    ALKPHOS 98 10/08/2021    BILITOT 0.6 10/08/2021       Lipids:   Lab Results   Component Value Date    CHOL 216 (H) 10/08/2021    HDL 33 (L) 10/08/2021    LDLCALC 136 (H) 10/08/2021    TRIG 237 (H) 10/08/2021       INR: No results found for: INR, PROTIME    U/A:  Lab Results   Component Value Date    LABMICR Not Indicated 08/28/2020          Lab Results   Component Value Date    LABA1C 5.7 03/16/2018        Lab Results   Component Value Date    CREATININE 0.9 10/08/2021       -----------------------------------------------------------------     Assessment/Plan:   1. Prostate cancer [facility])  This problem is stable with active surveillance followed by urology  - PSA, Prostatic Specific Antigen; Future    2. Primary hypertension  Blood pressure slightly elevated initially as a result of not taking his medicine will restart his low-dose Johanna HCT    3. Familial hypercholesterolemia  Problem is stable check above labs continue diet and exercise  - CBC with Auto Differential; Future  - Comprehensive Metabolic Panel; Future  - Lipid Panel; Future  - TSH; Future  - Urinalysis; Future  - Vitamin D 25 Hydroxy; Future    4. Gastroesophageal reflux disease without esophagitis  Problem is stable with over-the-counter medicine    5. Vitamin D deficiency  Check above labs  - Vitamin D 25 Hydroxy; Future    6. PE (physical exam), annual  Check screening labs continue diet and exercise flu shot given as well as Prevnar he is to get a COVID-19 by Valent booster and increase exercise and watch diet  - CBC with Auto Differential; Future  - Comprehensive Metabolic Panel; Future  - Lipid Panel; Future  - TSH; Future  - Urinalysis; Future  - Vitamin D 25 Hydroxy;  Future

## 2022-11-19 DIAGNOSIS — E55.9 VITAMIN D DEFICIENCY: ICD-10-CM

## 2022-11-19 DIAGNOSIS — E78.01 FAMILIAL HYPERCHOLESTEROLEMIA: Chronic | ICD-10-CM

## 2022-11-19 DIAGNOSIS — C61 PROSTATE CANCER (HCC): ICD-10-CM

## 2022-11-19 DIAGNOSIS — Z00.00 PE (PHYSICAL EXAM), ANNUAL: ICD-10-CM

## 2022-11-19 LAB
A/G RATIO: 2 (ref 1.1–2.2)
ALBUMIN SERPL-MCNC: 4.4 G/DL (ref 3.4–5)
ALP BLD-CCNC: 94 U/L (ref 40–129)
ALT SERPL-CCNC: 17 U/L (ref 10–40)
ANION GAP SERPL CALCULATED.3IONS-SCNC: 11 MMOL/L (ref 3–16)
AST SERPL-CCNC: 19 U/L (ref 15–37)
BACTERIA: NORMAL /HPF
BASOPHILS ABSOLUTE: 0 K/UL (ref 0–0.2)
BASOPHILS RELATIVE PERCENT: 0.6 %
BILIRUB SERPL-MCNC: 0.4 MG/DL (ref 0–1)
BILIRUBIN URINE: NEGATIVE
BLOOD, URINE: NEGATIVE
BUN BLDV-MCNC: 13 MG/DL (ref 7–20)
CALCIUM SERPL-MCNC: 9.8 MG/DL (ref 8.3–10.6)
CHLORIDE BLD-SCNC: 97 MMOL/L (ref 99–110)
CHOLESTEROL, TOTAL: 184 MG/DL (ref 0–199)
CLARITY: CLEAR
CO2: 29 MMOL/L (ref 21–32)
COLOR: YELLOW
CREAT SERPL-MCNC: 1 MG/DL (ref 0.8–1.3)
EOSINOPHILS ABSOLUTE: 0.4 K/UL (ref 0–0.6)
EOSINOPHILS RELATIVE PERCENT: 4.5 %
EPITHELIAL CELLS, UA: 0 /HPF (ref 0–5)
GFR SERPL CREATININE-BSD FRML MDRD: >60 ML/MIN/{1.73_M2}
GLUCOSE BLD-MCNC: 100 MG/DL (ref 70–99)
GLUCOSE URINE: NEGATIVE MG/DL
HCT VFR BLD CALC: 45.9 % (ref 40.5–52.5)
HDLC SERPL-MCNC: 27 MG/DL (ref 40–60)
HEMOGLOBIN: 15.6 G/DL (ref 13.5–17.5)
HYALINE CASTS: 0 /LPF (ref 0–8)
KETONES, URINE: NEGATIVE MG/DL
LDL CHOLESTEROL CALCULATED: 103 MG/DL
LEUKOCYTE ESTERASE, URINE: ABNORMAL
LYMPHOCYTES ABSOLUTE: 1.8 K/UL (ref 1–5.1)
LYMPHOCYTES RELATIVE PERCENT: 23.5 %
MCH RBC QN AUTO: 27.5 PG (ref 26–34)
MCHC RBC AUTO-ENTMCNC: 33.9 G/DL (ref 31–36)
MCV RBC AUTO: 81.2 FL (ref 80–100)
MICROSCOPIC EXAMINATION: YES
MONOCYTES ABSOLUTE: 0.8 K/UL (ref 0–1.3)
MONOCYTES RELATIVE PERCENT: 9.6 %
NEUTROPHILS ABSOLUTE: 4.9 K/UL (ref 1.7–7.7)
NEUTROPHILS RELATIVE PERCENT: 61.8 %
NITRITE, URINE: NEGATIVE
PDW BLD-RTO: 14.3 % (ref 12.4–15.4)
PH UA: 6 (ref 5–8)
PLATELET # BLD: 221 K/UL (ref 135–450)
PMV BLD AUTO: 9.6 FL (ref 5–10.5)
POTASSIUM SERPL-SCNC: 4.1 MMOL/L (ref 3.5–5.1)
PROSTATE SPECIFIC ANTIGEN: 7.88 NG/ML (ref 0–4)
PROTEIN UA: NEGATIVE MG/DL
RBC # BLD: 5.66 M/UL (ref 4.2–5.9)
RBC UA: 0 /HPF (ref 0–4)
SODIUM BLD-SCNC: 137 MMOL/L (ref 136–145)
SPECIFIC GRAVITY UA: 1.02 (ref 1–1.03)
TOTAL PROTEIN: 6.6 G/DL (ref 6.4–8.2)
TRIGL SERPL-MCNC: 268 MG/DL (ref 0–150)
TSH SERPL DL<=0.05 MIU/L-ACNC: 1.47 UIU/ML (ref 0.27–4.2)
URINE TYPE: ABNORMAL
UROBILINOGEN, URINE: 0.2 E.U./DL
VITAMIN D 25-HYDROXY: 33.7 NG/ML
VLDLC SERPL CALC-MCNC: 54 MG/DL
WBC # BLD: 7.9 K/UL (ref 4–11)
WBC UA: 1 /HPF (ref 0–5)

## 2023-03-16 ENCOUNTER — HOSPITAL ENCOUNTER (OUTPATIENT)
Dept: GENERAL RADIOLOGY | Age: 68
Discharge: HOME | End: 2023-03-16
Payer: COMMERCIAL

## 2023-03-16 ENCOUNTER — HOSPITAL ENCOUNTER (OUTPATIENT)
Age: 68
Discharge: HOME | End: 2023-03-16
Payer: COMMERCIAL

## 2023-03-16 ENCOUNTER — OFFICE VISIT (OUTPATIENT)
Dept: INTERNAL MEDICINE CLINIC | Age: 68
End: 2023-03-16
Payer: COMMERCIAL

## 2023-03-16 VITALS
HEIGHT: 68 IN | SYSTOLIC BLOOD PRESSURE: 123 MMHG | WEIGHT: 181 LBS | HEART RATE: 56 BPM | DIASTOLIC BLOOD PRESSURE: 58 MMHG | BODY MASS INDEX: 27.43 KG/M2

## 2023-03-16 DIAGNOSIS — G95.19 NEUROGENIC CLAUDICATION (HCC): ICD-10-CM

## 2023-03-16 DIAGNOSIS — G95.19 NEUROGENIC CLAUDICATION (HCC): Primary | ICD-10-CM

## 2023-03-16 PROCEDURE — 3078F DIAST BP <80 MM HG: CPT | Performed by: INTERNAL MEDICINE

## 2023-03-16 PROCEDURE — 72100 X-RAY EXAM L-S SPINE 2/3 VWS: CPT

## 2023-03-16 PROCEDURE — 3074F SYST BP LT 130 MM HG: CPT | Performed by: INTERNAL MEDICINE

## 2023-03-16 PROCEDURE — 99213 OFFICE O/P EST LOW 20 MIN: CPT | Performed by: INTERNAL MEDICINE

## 2023-03-16 PROCEDURE — 1123F ACP DISCUSS/DSCN MKR DOCD: CPT | Performed by: INTERNAL MEDICINE

## 2023-03-16 RX ORDER — MIRTAZAPINE 7.5 MG/1
7.5 TABLET, FILM COATED ORAL NIGHTLY
COMMUNITY

## 2023-03-16 SDOH — ECONOMIC STABILITY: INCOME INSECURITY: HOW HARD IS IT FOR YOU TO PAY FOR THE VERY BASICS LIKE FOOD, HOUSING, MEDICAL CARE, AND HEATING?: NOT HARD AT ALL

## 2023-03-16 SDOH — ECONOMIC STABILITY: FOOD INSECURITY: WITHIN THE PAST 12 MONTHS, THE FOOD YOU BOUGHT JUST DIDN'T LAST AND YOU DIDN'T HAVE MONEY TO GET MORE.: NEVER TRUE

## 2023-03-16 SDOH — ECONOMIC STABILITY: HOUSING INSECURITY
IN THE LAST 12 MONTHS, WAS THERE A TIME WHEN YOU DID NOT HAVE A STEADY PLACE TO SLEEP OR SLEPT IN A SHELTER (INCLUDING NOW)?: NO

## 2023-03-16 SDOH — ECONOMIC STABILITY: FOOD INSECURITY: WITHIN THE PAST 12 MONTHS, YOU WORRIED THAT YOUR FOOD WOULD RUN OUT BEFORE YOU GOT MONEY TO BUY MORE.: NEVER TRUE

## 2023-03-16 ASSESSMENT — PATIENT HEALTH QUESTIONNAIRE - PHQ9
8. MOVING OR SPEAKING SO SLOWLY THAT OTHER PEOPLE COULD HAVE NOTICED. OR THE OPPOSITE, BEING SO FIGETY OR RESTLESS THAT YOU HAVE BEEN MOVING AROUND A LOT MORE THAN USUAL: 0
3. TROUBLE FALLING OR STAYING ASLEEP: 0
1. LITTLE INTEREST OR PLEASURE IN DOING THINGS: 0
9. THOUGHTS THAT YOU WOULD BE BETTER OFF DEAD, OR OF HURTING YOURSELF: 0
2. FEELING DOWN, DEPRESSED OR HOPELESS: 0
SUM OF ALL RESPONSES TO PHQ9 QUESTIONS 1 & 2: 0
SUM OF ALL RESPONSES TO PHQ QUESTIONS 1-9: 0
6. FEELING BAD ABOUT YOURSELF - OR THAT YOU ARE A FAILURE OR HAVE LET YOURSELF OR YOUR FAMILY DOWN: 0
4. FEELING TIRED OR HAVING LITTLE ENERGY: 0
7. TROUBLE CONCENTRATING ON THINGS, SUCH AS READING THE NEWSPAPER OR WATCHING TELEVISION: 0
SUM OF ALL RESPONSES TO PHQ QUESTIONS 1-9: 0
SUM OF ALL RESPONSES TO PHQ QUESTIONS 1-9: 0
10. IF YOU CHECKED OFF ANY PROBLEMS, HOW DIFFICULT HAVE THESE PROBLEMS MADE IT FOR YOU TO DO YOUR WORK, TAKE CARE OF THINGS AT HOME, OR GET ALONG WITH OTHER PEOPLE: 0
5. POOR APPETITE OR OVEREATING: 0
SUM OF ALL RESPONSES TO PHQ QUESTIONS 1-9: 0

## 2023-03-16 NOTE — PROGRESS NOTES
CHIEF COMPLAINT: Alexander Montez is a 74 y.o. male who presents for : Pain of the legs    HPI: Patient presented with the legs and occurs with exercise it starts in his calves and then goes up to his back he can vary from a small amount of exercise to a large amount he is worried that his might be vascular he has no risk factors for vascular disease other than hypertension he does not smoke    Review of Systems:   Constitutional:  Denies fever or chills   Eyes:  Denies change in visual acuity   HENT:  Denies nasal congestion or sore throat   Respiratory:  Denies cough or shortness of breath   Cardiovascular:  Denies chest pain or edema   GI:  Denies abdominal pain, nausea, vomiting, bloody stools or diarrhea   :  Denies dysuria   Musculoskeletal:  Denies back pain or joint pain   Integument:  Denies rash   Neurologic:  Denies headache, focal weakness or sensory changes   Endocrine:  Denies polyuria or polydipsia   Lymphatic:  Denies swollen glands   Psychiatric:  Denies depression or anxiety     Past Medical History:        Diagnosis Date    BPH (benign prostatic hyperplasia) 10/5/2010    Depression 10/5/2010    GERD (gastroesophageal reflux disease) 10/5/2010    Meniere disease 10/5/2010    Other and unspecified hyperlipidemia 10/5/2010    Primary hypertension 10/4/2021    Primary hypertension 10/4/2021    Primary insomnia 9/1/2020    [facility] 10/5/2010    Unspecified vitamin D deficiency 3/25/2013       Past Surgical History:        Procedure Laterality Date    INNER EAR SURGERY      TONSILLECTOMY         Family History:  Family History   Problem Relation Age of Onset    Breast Cancer Mother     Cancer Father        Social History:  Social History     Socioeconomic History    Marital status:      Spouse name: None    Number of children: None    Years of education: None    Highest education level: None   Tobacco Use    Smoking status: Never    Smokeless tobacco: Never   Substance and Sexual Activity    Alcohol use: No     Social Determinants of Health     Financial Resource Strain: Low Risk     Difficulty of Paying Living Expenses: Not hard at all   Food Insecurity: No Food Insecurity    Worried About Running Out of Food in the Last Year: Never true    Ran Out of Food in the Last Year: Never true   Transportation Needs: Unknown    Lack of Transportation (Non-Medical): No   Housing Stability: Unknown    Unstable Housing in the Last Year: No         Allergies:  Patient has no known allergies. Current Medications:    Prior to Admission medications    Medication Sig Start Date End Date Taking? Authorizing Provider   mirtazapine (REMERON) 7.5 MG tablet Take 7.5 mg by mouth nightly   Yes Historical Provider, MD   losartan-hydroCHLOROthiazide (HYZAAR) 50-12.5 MG per tablet Take 1 tablet by mouth daily 11/14/22  Yes Andrew Senior MD       Physical Exam:  Vital Signs: BP (!) 123/58   Pulse 56   Ht 5' 8\" (1.727 m)   Wt 181 lb (82.1 kg)   BMI 27.52 kg/m²   General: Patient appears  non-toxic  HENT: Atraumatic, normocephalic, oral mucosa moist  Lungs:  Clear bilaterally  Heart: Regular rate and rhythm  Abdomen: Non-distended, soft, non-tender  Extremities: No edema pulses of the extremity are 2+ and symmetrical good capillary refill of the great toes  Neuro: Nonfocal negative straight leg test    Medical Decision Making and Plan:  Pertinent Labs & Imaging studies reviewed. (See chart for details)      Get x-rays of the lumbar spine as symptoms are more consistent with neurogenic claudication if positive for spinal stenosis we will start on physical therapy 1. Neurogenic claudication (HCC)    - XR LUMBAR SPINE (2-3 VIEWS);  Future

## 2023-08-30 ENCOUNTER — TELEPHONE (OUTPATIENT)
Dept: INTERNAL MEDICINE CLINIC | Age: 68
End: 2023-08-30

## 2023-08-30 DIAGNOSIS — C61 PROSTATE CANCER (HCC): ICD-10-CM

## 2023-08-30 DIAGNOSIS — E78.01 FAMILIAL HYPERCHOLESTEROLEMIA: ICD-10-CM

## 2023-08-30 DIAGNOSIS — Z13.1 SCREENING FOR DIABETES MELLITUS: Primary | ICD-10-CM

## 2023-08-30 DIAGNOSIS — I10 PRIMARY HYPERTENSION: ICD-10-CM

## 2023-08-30 NOTE — TELEPHONE ENCOUNTER
----- Message from Brandi Leon sent at 8/30/2023 11:39 AM EDT -----  Subject: Appointment Request    Reason for Call: Established Patient Appointment needed: Routine Physical   Exam    QUESTIONS    Reason for appointment request? Available appointments did not meet   patient need     Additional Information for Provider? Patient was hoping to schedule his   annual physical exam on the last week of September if all possible.  Please   call   ---------------------------------------------------------------------------  --------------  Markos Getting INFO  [phone]; OK to leave message on voicemail  ---------------------------------------------------------------------------  --------------  SCRIPT ANSWERS

## 2023-11-27 DIAGNOSIS — C61 PROSTATE CANCER (HCC): ICD-10-CM

## 2023-11-27 DIAGNOSIS — I10 PRIMARY HYPERTENSION: ICD-10-CM

## 2023-11-27 DIAGNOSIS — E78.01 FAMILIAL HYPERCHOLESTEROLEMIA: ICD-10-CM

## 2023-11-27 DIAGNOSIS — Z13.1 SCREENING FOR DIABETES MELLITUS: ICD-10-CM

## 2023-11-27 LAB
BASOPHILS # BLD: 0 K/UL (ref 0–0.2)
BASOPHILS NFR BLD: 0.4 %
BILIRUB UR QL STRIP.AUTO: NEGATIVE
CLARITY UR: CLEAR
COLOR UR: YELLOW
DEPRECATED RDW RBC AUTO: 15.1 % (ref 12.4–15.4)
EOSINOPHIL # BLD: 0.3 K/UL (ref 0–0.6)
EOSINOPHIL NFR BLD: 4.1 %
GLUCOSE UR STRIP.AUTO-MCNC: NEGATIVE MG/DL
HCT VFR BLD AUTO: 46.8 % (ref 40.5–52.5)
HGB BLD-MCNC: 15.8 G/DL (ref 13.5–17.5)
HGB UR QL STRIP.AUTO: NEGATIVE
KETONES UR STRIP.AUTO-MCNC: NEGATIVE MG/DL
LEUKOCYTE ESTERASE UR QL STRIP.AUTO: NEGATIVE
LYMPHOCYTES # BLD: 2.6 K/UL (ref 1–5.1)
LYMPHOCYTES NFR BLD: 31.5 %
MCH RBC QN AUTO: 27.5 PG (ref 26–34)
MCHC RBC AUTO-ENTMCNC: 33.7 G/DL (ref 31–36)
MCV RBC AUTO: 81.7 FL (ref 80–100)
MONOCYTES # BLD: 0.7 K/UL (ref 0–1.3)
MONOCYTES NFR BLD: 8.6 %
NEUTROPHILS # BLD: 4.5 K/UL (ref 1.7–7.7)
NEUTROPHILS NFR BLD: 55.4 %
NITRITE UR QL STRIP.AUTO: NEGATIVE
PH UR STRIP.AUTO: 6 [PH] (ref 5–8)
PLATELET # BLD AUTO: 244 K/UL (ref 135–450)
PMV BLD AUTO: 9.3 FL (ref 5–10.5)
PROT UR STRIP.AUTO-MCNC: NEGATIVE MG/DL
RBC # BLD AUTO: 5.73 M/UL (ref 4.2–5.9)
SP GR UR STRIP.AUTO: 1.02 (ref 1–1.03)
UA DIPSTICK W REFLEX MICRO PNL UR: NORMAL
URN SPEC COLLECT METH UR: NORMAL
UROBILINOGEN UR STRIP-ACNC: 0.2 E.U./DL
WBC # BLD AUTO: 8.2 K/UL (ref 4–11)

## 2023-11-28 ENCOUNTER — OFFICE VISIT (OUTPATIENT)
Dept: INTERNAL MEDICINE CLINIC | Age: 68
End: 2023-11-28
Payer: COMMERCIAL

## 2023-11-28 VITALS
HEIGHT: 69 IN | DIASTOLIC BLOOD PRESSURE: 80 MMHG | HEART RATE: 49 BPM | WEIGHT: 177 LBS | SYSTOLIC BLOOD PRESSURE: 160 MMHG | BODY MASS INDEX: 26.22 KG/M2 | OXYGEN SATURATION: 98 %

## 2023-11-28 DIAGNOSIS — Z23 NEED FOR INFLUENZA VACCINATION: ICD-10-CM

## 2023-11-28 DIAGNOSIS — Z00.00 PE (PHYSICAL EXAM), ANNUAL: Primary | ICD-10-CM

## 2023-11-28 DIAGNOSIS — E55.9 VITAMIN D DEFICIENCY: ICD-10-CM

## 2023-11-28 DIAGNOSIS — C61 PROSTATE CANCER (HCC): ICD-10-CM

## 2023-11-28 LAB
ALBUMIN SERPL-MCNC: 4.8 G/DL (ref 3.4–5)
ALBUMIN/GLOB SERPL: 2.3 {RATIO} (ref 1.1–2.2)
ALP SERPL-CCNC: 88 U/L (ref 40–129)
ALT SERPL-CCNC: 21 U/L (ref 10–40)
ANION GAP SERPL CALCULATED.3IONS-SCNC: 13 MMOL/L (ref 3–16)
AST SERPL-CCNC: 17 U/L (ref 15–37)
BILIRUB SERPL-MCNC: 0.3 MG/DL (ref 0–1)
BUN SERPL-MCNC: 11 MG/DL (ref 7–20)
CALCIUM SERPL-MCNC: 9.4 MG/DL (ref 8.3–10.6)
CHLORIDE SERPL-SCNC: 103 MMOL/L (ref 99–110)
CHOLEST SERPL-MCNC: 191 MG/DL (ref 0–199)
CO2 SERPL-SCNC: 25 MMOL/L (ref 21–32)
CREAT SERPL-MCNC: 1 MG/DL (ref 0.8–1.3)
EST. AVERAGE GLUCOSE BLD GHB EST-MCNC: 108.3 MG/DL
GFR SERPLBLD CREATININE-BSD FMLA CKD-EPI: >60 ML/MIN/{1.73_M2}
GLUCOSE SERPL-MCNC: 90 MG/DL (ref 70–99)
HBA1C MFR BLD: 5.4 %
HDLC SERPL-MCNC: 31 MG/DL (ref 40–60)
LDLC SERPL CALC-MCNC: 108 MG/DL
POTASSIUM SERPL-SCNC: 4.6 MMOL/L (ref 3.5–5.1)
PROT SERPL-MCNC: 6.9 G/DL (ref 6.4–8.2)
PSA SERPL DL<=0.01 NG/ML-MCNC: 9.17 NG/ML (ref 0–4)
SODIUM SERPL-SCNC: 141 MMOL/L (ref 136–145)
TRIGL SERPL-MCNC: 260 MG/DL (ref 0–150)
TSH SERPL DL<=0.005 MIU/L-ACNC: 1.56 UIU/ML (ref 0.27–4.2)
VLDLC SERPL CALC-MCNC: 52 MG/DL

## 2023-11-28 PROCEDURE — G0439 PPPS, SUBSEQ VISIT: HCPCS | Performed by: INTERNAL MEDICINE

## 2023-11-28 PROCEDURE — 1123F ACP DISCUSS/DSCN MKR DOCD: CPT | Performed by: INTERNAL MEDICINE

## 2023-11-28 PROCEDURE — 90471 IMMUNIZATION ADMIN: CPT | Performed by: INTERNAL MEDICINE

## 2023-11-28 PROCEDURE — 90694 VACC AIIV4 NO PRSRV 0.5ML IM: CPT | Performed by: INTERNAL MEDICINE

## 2023-11-28 PROCEDURE — 3079F DIAST BP 80-89 MM HG: CPT | Performed by: INTERNAL MEDICINE

## 2023-11-28 PROCEDURE — 3077F SYST BP >= 140 MM HG: CPT | Performed by: INTERNAL MEDICINE

## 2023-11-28 RX ORDER — LOSARTAN POTASSIUM AND HYDROCHLOROTHIAZIDE 12.5; 5 MG/1; MG/1
1 TABLET ORAL DAILY
Qty: 90 TABLET | Refills: 3 | Status: SHIPPED | OUTPATIENT
Start: 2023-11-28

## 2023-11-28 NOTE — PROGRESS NOTES
Annual Wellness Visit     Patient:  Vanessa Flores                                               : 1955  Age: 79 y.o. MRN: [id number]  Date : 2023      CHIEF COMPLAINT: Vanessa Flores is a 79 y.o. male who presents for : Physical exam    1. Prostate cancer ([address])  This problem is stable now on active surveillance no increased symptoms gets up about 3 times at night to urinate    2. Vitamin D deficiency      3. Need for influenza vaccination    - Influenza, FLUAD, (age 72 y+), IM, Preservative Free, 0.5 mL    4.  PE (physical exam), annual  Generally feels good denies any chest pain shortness of breath or any other problems recently moved to [facility]        Patient Active Problem List    Diagnosis Date Noted    Primary hypertension 10/04/2021    Primary insomnia 2020    Prostate cancer ([address] ) 2014    Vitamin D deficiency 2013    EKG abnormalities 2012    Hyperlipidemia 10/05/2010    Depression 10/05/2010    GERD (gastroesophageal reflux disease) 10/05/2010    Meniere disease 10/05/2010       Constitutional:  Denies fever or chills   Eyes:  Denies change in visual acuity   HENT:  Denies nasal congestion or sore throat   Respiratory:  Denies cough or shortness of breath   Cardiovascular:  Denies chest pain or edema   GI:  Denies abdominal pain, nausea, vomiting, bloody stools or diarrhea   :  Denies dysuria   Musculoskeletal:  Denies back pain or joint pain   Integument:  Denies rash   Neurologic:  Denies headache, focal weakness or sensory changes   Endocrine:  Denies polyuria or polydipsia   Lymphatic:  Denies swollen glands   Psychiatric:  Denies depression or anxiety     Past Medical History:        Diagnosis Date    BPH (benign prostatic hyperplasia) 10/5/2010    Depression 10/5/2010    GERD (gastroesophageal reflux disease) 10/5/2010    Meniere disease 10/5/2010    Other and unspecified hyperlipidemia 10/5/2010    Primary hypertension 10/4/2021    Primary hypertension

## 2023-12-11 ENCOUNTER — TELEPHONE (OUTPATIENT)
Dept: INTERNAL MEDICINE CLINIC | Age: 68
End: 2023-12-11

## 2023-12-11 DIAGNOSIS — F12.90 MARIJUANA SMOKER, CONTINUOUS: Primary | ICD-10-CM

## 2023-12-11 NOTE — TELEPHONE ENCOUNTER
Ok to have lung screening. Message left for patient. How would you like to receive the lung screening order, email, mail,  at office? Call back to let us know.

## 2023-12-11 NOTE — TELEPHONE ENCOUNTER
Pt would like a referral for a lung scan due to 45yrs or smoking marijuana and wants to know the damage done but pt said he now lives in [place] and would need it for somewhere over there.      Please call and advise

## 2023-12-12 NOTE — TELEPHONE ENCOUNTER
Pt called back confirming he would it emailed.  Confirmed email on file is correct     Please advise 1.66

## 2023-12-13 ENCOUNTER — TELEPHONE (OUTPATIENT)
Dept: INTERNAL MEDICINE CLINIC | Age: 68
End: 2023-12-13

## 2023-12-13 NOTE — TELEPHONE ENCOUNTER
Patient states his prostate DRJennifer In [place] told him today he has high blood pressure and low pulse and he needs to follow up with his PCP on this. States he is moving to [place] permanently but wasn't sure if DR. Cher Kelly thought he should see someone down there or send him a  referral for a cardiologists.     Please call and advise [phone]

## 2023-12-15 RX ORDER — BENAZEPRIL HYDROCHLORIDE 20 MG/1
20 TABLET ORAL DAILY
COMMUNITY
Start: 2023-12-14 | End: 2024-12-13

## 2023-12-15 RX ORDER — ALFUZOSIN HYDROCHLORIDE 10 MG/1
10 TABLET, EXTENDED RELEASE ORAL DAILY
COMMUNITY
Start: 2023-12-13 | End: 2024-12-12

## 2023-12-15 NOTE — TELEPHONE ENCOUNTER
Pt was seen by cardio yesterday and med was changed to Benazepril 20 mg and also added Alfuzosin 10 mg.

## 2024-03-11 ENCOUNTER — TELEPHONE (OUTPATIENT)
Dept: INTERNAL MEDICINE CLINIC | Age: 69
End: 2024-03-11

## 2024-03-11 NOTE — TELEPHONE ENCOUNTER
aL calling from [place] in [place], Ph. [phone]    Is calling stating pt does not qualifiy for CT cance lung screening due to not being a cigarette smoker and that vaping or marijuana don't count. She also states the order needs to be change to a Chest CT without contrast if  Still wanted imaging to be done.       Please call and advise

## 2024-03-11 NOTE — TELEPHONE ENCOUNTER
Spoke to pt informed it would be a lot if goes to there  Pt stated he will wait until he is back in [place]

## 2024-03-18 ENCOUNTER — TELEPHONE (OUTPATIENT)
Dept: CARDIOLOGY CLINIC | Age: 69
End: 2024-03-18

## 2024-03-18 NOTE — TELEPHONE ENCOUNTER
Patient was referred by Dr. Roel Talavera to the [facility] location with Dr. Subramanian.  Patient has been scheduled for 04/09 at 9:00aaam (am/pm).  Patient verbalized understanding of appointment instructions.  Call complete.

## 2024-04-03 ENCOUNTER — TELEPHONE (OUTPATIENT)
Dept: CARDIOLOGY CLINIC | Age: 69
End: 2024-04-03

## 2024-04-03 NOTE — TELEPHONE ENCOUNTER
Spoke with pt to gain clarification on office visit needs. He has recently moved from [place] to [place]. Originally scheduled appointment for second opinion while he was going to be in town. Pt would like to cancel appointment, and based off further testing may reschedule in the future. Appointment cancelled. Encouraged to call back with further needs. Pt verbalized understanding.

## 2024-04-09 ENCOUNTER — OFFICE VISIT (OUTPATIENT)
Dept: INTERNAL MEDICINE CLINIC | Age: 69
End: 2024-04-09
Payer: COMMERCIAL

## 2024-04-09 VITALS
WEIGHT: 178 LBS | SYSTOLIC BLOOD PRESSURE: 128 MMHG | DIASTOLIC BLOOD PRESSURE: 70 MMHG | BODY MASS INDEX: 26.98 KG/M2 | HEIGHT: 68 IN

## 2024-04-09 DIAGNOSIS — E78.01 FAMILIAL HYPERCHOLESTEROLEMIA: Chronic | ICD-10-CM

## 2024-04-09 DIAGNOSIS — R07.89 OTHER CHEST PAIN: Primary | ICD-10-CM

## 2024-04-09 DIAGNOSIS — I10 PRIMARY HYPERTENSION: ICD-10-CM

## 2024-04-09 DIAGNOSIS — I73.9 CLAUDICATION (HCC): ICD-10-CM

## 2024-04-09 DIAGNOSIS — C61 PROSTATE CANCER (HCC): ICD-10-CM

## 2024-04-09 PROCEDURE — 1123F ACP DISCUSS/DSCN MKR DOCD: CPT | Performed by: INTERNAL MEDICINE

## 2024-04-09 PROCEDURE — 99214 OFFICE O/P EST MOD 30 MIN: CPT | Performed by: INTERNAL MEDICINE

## 2024-04-09 PROCEDURE — 3078F DIAST BP <80 MM HG: CPT | Performed by: INTERNAL MEDICINE

## 2024-04-09 PROCEDURE — 3074F SYST BP LT 130 MM HG: CPT | Performed by: INTERNAL MEDICINE

## 2024-04-09 RX ORDER — TAMSULOSIN HYDROCHLORIDE 0.4 MG/1
0.4 CAPSULE ORAL DAILY
COMMUNITY

## 2024-04-09 RX ORDER — AMLODIPINE BESYLATE 10 MG/1
10 TABLET ORAL DAILY
COMMUNITY

## 2024-04-09 RX ORDER — CHLORAL HYDRATE 500 MG
CAPSULE ORAL DAILY
COMMUNITY

## 2024-04-09 RX ORDER — ROSUVASTATIN CALCIUM 10 MG/1
10 TABLET, COATED ORAL DAILY
COMMUNITY

## 2024-04-09 RX ORDER — LOSARTAN POTASSIUM 100 MG/1
100 TABLET ORAL DAILY
COMMUNITY

## 2024-04-09 SDOH — ECONOMIC STABILITY: FOOD INSECURITY: WITHIN THE PAST 12 MONTHS, THE FOOD YOU BOUGHT JUST DIDN'T LAST AND YOU DIDN'T HAVE MONEY TO GET MORE.: NEVER TRUE

## 2024-04-09 SDOH — ECONOMIC STABILITY: FOOD INSECURITY: WITHIN THE PAST 12 MONTHS, YOU WORRIED THAT YOUR FOOD WOULD RUN OUT BEFORE YOU GOT MONEY TO BUY MORE.: NEVER TRUE

## 2024-04-09 SDOH — ECONOMIC STABILITY: INCOME INSECURITY: HOW HARD IS IT FOR YOU TO PAY FOR THE VERY BASICS LIKE FOOD, HOUSING, MEDICAL CARE, AND HEATING?: NOT HARD AT ALL

## 2024-04-09 ASSESSMENT — PATIENT HEALTH QUESTIONNAIRE - PHQ9
6. FEELING BAD ABOUT YOURSELF - OR THAT YOU ARE A FAILURE OR HAVE LET YOURSELF OR YOUR FAMILY DOWN: SEVERAL DAYS
6. FEELING BAD ABOUT YOURSELF - OR THAT YOU ARE A FAILURE OR HAVE LET YOURSELF OR YOUR FAMILY DOWN: SEVERAL DAYS
7. TROUBLE CONCENTRATING ON THINGS, SUCH AS READING THE NEWSPAPER OR WATCHING TELEVISION: NOT AT ALL
8. MOVING OR SPEAKING SO SLOWLY THAT OTHER PEOPLE COULD HAVE NOTICED. OR THE OPPOSITE, BEING SO FIGETY OR RESTLESS THAT YOU HAVE BEEN MOVING AROUND A LOT MORE THAN USUAL: NOT AT ALL
5. POOR APPETITE OR OVEREATING: NOT AT ALL
9. THOUGHTS THAT YOU WOULD BE BETTER OFF DEAD, OR OF HURTING YOURSELF: NOT AT ALL
2. FEELING DOWN, DEPRESSED OR HOPELESS: SEVERAL DAYS
5. POOR APPETITE OR OVEREATING: NOT AT ALL
10. IF YOU CHECKED OFF ANY PROBLEMS, HOW DIFFICULT HAVE THESE PROBLEMS MADE IT FOR YOU TO DO YOUR WORK, TAKE CARE OF THINGS AT HOME, OR GET ALONG WITH OTHER PEOPLE: NOT DIFFICULT AT ALL
8. MOVING OR SPEAKING SO SLOWLY THAT OTHER PEOPLE COULD HAVE NOTICED. OR THE OPPOSITE - BEING SO FIDGETY OR RESTLESS THAT YOU HAVE BEEN MOVING AROUND A LOT MORE THAN USUAL: NOT AT ALL
4. FEELING TIRED OR HAVING LITTLE ENERGY: NOT AT ALL
9. THOUGHTS THAT YOU WOULD BE BETTER OFF DEAD, OR OF HURTING YOURSELF: NOT AT ALL
2. FEELING DOWN, DEPRESSED OR HOPELESS: SEVERAL DAYS
4. FEELING TIRED OR HAVING LITTLE ENERGY: NOT AT ALL
SUM OF ALL RESPONSES TO PHQ QUESTIONS 1-9: 2
1. LITTLE INTEREST OR PLEASURE IN DOING THINGS: NOT AT ALL
10. IF YOU CHECKED OFF ANY PROBLEMS, HOW DIFFICULT HAVE THESE PROBLEMS MADE IT FOR YOU TO DO YOUR WORK, TAKE CARE OF THINGS AT HOME, OR GET ALONG WITH OTHER PEOPLE: NOT DIFFICULT AT ALL
3. TROUBLE FALLING OR STAYING ASLEEP: NOT AT ALL
SUM OF ALL RESPONSES TO PHQ QUESTIONS 1-9: 2
7. TROUBLE CONCENTRATING ON THINGS, SUCH AS READING THE NEWSPAPER OR WATCHING TELEVISION: NOT AT ALL
SUM OF ALL RESPONSES TO PHQ QUESTIONS 1-9: 2
SUM OF ALL RESPONSES TO PHQ QUESTIONS 1-9: 2
3. TROUBLE FALLING OR STAYING ASLEEP: NOT AT ALL
SUM OF ALL RESPONSES TO PHQ9 QUESTIONS 1 & 2: 1
SUM OF ALL RESPONSES TO PHQ QUESTIONS 1-9: 2
1. LITTLE INTEREST OR PLEASURE IN DOING THINGS: NOT AT ALL

## 2024-04-09 NOTE — PROGRESS NOTES
Outpatient Clinic Established Patient Note    Patient: Jamarcus Harper  : 1955 (68 y.o.)  Date: 2024    CC: Chest pain    HPI:    Chest pain  He has soreness of the chest described as a \"musculoskeletal\" pain over the right inferior anterolateral chest. Patient to have lung CT screening today, will follow result and symptoms.    PAD  BLE claudication - neurogenic and vascular  He had an aortic ultrasound demonstrating up to 75% stenosis of the distal abdominal aorta. He has BLE pain on exertion which is intermittent and at times limits his activity severely and at other times hardly at all. This intermittent quality seems unpredictable. Aortogram scheduled this coming Monday to decide stent vs. surgery vs. observation.     Hypertension  Now well-controlled on losartan 100mg & Amlodipine 10mg. BP in the clinic 128/70.    Prostate cancer  Had MRI of the prostate but Dr. Bosch at [place] has not been able to compare this to prior MRI. He has brought his MRI compact disc with him from [place] to get Dr. Kaur at  to compare to prior. Last PSA 9.17 on 23 from 7.88 on 23 and 8.84 on 10/8/21.    History of marijuana use  45 year history of regular marijuana smoking. Lung CT screening will be done today.     Right abdominal pain  Related to inguinal hernia on the right. No signs or symptoms of strangulation.    Diet is improved, cutting back on fast food, using healthier cooking oils. He remains as active as he can be given the BLE claudication which at times limits his activity.    Home Meds:  Prior to Visit Medications    Medication Sig Taking? Authorizing Provider   amLODIPine (NORVASC) 10 MG tablet Take 1 tablet by mouth daily Yes ProviderHakeem MD   tamsulosin (FLOMAX) 0.4 MG capsule Take 1 capsule by mouth daily Yes ProviderHakeem MD   rosuvastatin (CRESTOR) 10 MG tablet Take 1 tablet by mouth daily Yes ProviderHakeem MD   Omega-3 Fatty Acids (FISH OIL) 1000 MG capsule

## 2024-04-12 ENCOUNTER — TELEPHONE (OUTPATIENT)
Dept: INTERNAL MEDICINE CLINIC | Age: 69
End: 2024-04-12

## 2024-04-12 NOTE — TELEPHONE ENCOUNTER
Pt did his testing below at [facility] and they should have sent the results over by now and pt is requesting if Dr. Zimmer would share the results with him on [employer] to see.     Please call and advise [phone]    CT Lung Screen (Initial or Annual) [[id number]] (Order [id number])

## 2024-04-25 ENCOUNTER — TELEPHONE (OUTPATIENT)
Dept: INTERNAL MEDICINE CLINIC | Age: 69
End: 2024-04-25

## 2024-04-25 NOTE — TELEPHONE ENCOUNTER
Patient is requesting a copy of his proscan results from his lung screening   Please mail to:   [address] [place] [address]

## 2024-09-24 ENCOUNTER — TELEPHONE (OUTPATIENT)
Dept: INTERNAL MEDICINE CLINIC | Age: 69
End: 2024-09-24

## 2024-10-28 NOTE — TELEPHONE ENCOUNTER
Patient is requesting an anxiety medication.  He had really high /100 today.  He believes the anxiety is causing that.    Call patient and let him know if Dr. Zimmer would do that for him.    He cannot easily come in the office, he is currently in [place].    [facility]/PHARMACY #[id number] - [place] [id number] [facility] - P [phone] - F [phone] [[id number]]

## 2024-10-29 ENCOUNTER — TELEPHONE (OUTPATIENT)
Dept: INTERNAL MEDICINE CLINIC | Age: 69
End: 2024-10-29

## 2024-10-29 RX ORDER — HYDROXYZINE HYDROCHLORIDE 25 MG/1
25 TABLET, FILM COATED ORAL EVERY 6 HOURS PRN
Qty: 30 TABLET | Refills: 0 | Status: SHIPPED | OUTPATIENT
Start: 2024-10-29 | End: 2024-11-28

## 2025-07-15 ENCOUNTER — OFFICE VISIT (OUTPATIENT)
Dept: URBAN - METROPOLITAN AREA CLINIC 111 | Facility: CLINIC | Age: 70
End: 2025-07-15